# Patient Record
Sex: MALE | Race: WHITE
[De-identification: names, ages, dates, MRNs, and addresses within clinical notes are randomized per-mention and may not be internally consistent; named-entity substitution may affect disease eponyms.]

---

## 2018-10-05 ENCOUNTER — HOSPITAL ENCOUNTER (OUTPATIENT)
Dept: HOSPITAL 97 - ER | Age: 28
Setting detail: OBSERVATION
LOS: 2 days | Discharge: HOME | End: 2018-10-07
Attending: INTERNAL MEDICINE | Admitting: INTERNAL MEDICINE
Payer: SELF-PAY

## 2018-10-05 VITALS — OXYGEN SATURATION: 100 %

## 2018-10-05 VITALS — BODY MASS INDEX: 15.3 KG/M2

## 2018-10-05 DIAGNOSIS — K21.9: ICD-10-CM

## 2018-10-05 DIAGNOSIS — F12.90: ICD-10-CM

## 2018-10-05 DIAGNOSIS — F17.210: ICD-10-CM

## 2018-10-05 DIAGNOSIS — Z23: ICD-10-CM

## 2018-10-05 DIAGNOSIS — R10.13: ICD-10-CM

## 2018-10-05 DIAGNOSIS — R11.2: Primary | ICD-10-CM

## 2018-10-05 LAB
ALBUMIN SERPL BCP-MCNC: 4.7 G/DL (ref 3.4–5)
ALP SERPL-CCNC: 54 U/L (ref 45–117)
ALT SERPL W P-5'-P-CCNC: 24 U/L (ref 12–78)
AST SERPL W P-5'-P-CCNC: 14 U/L (ref 15–37)
BUN BLD-MCNC: 16 MG/DL (ref 7–18)
GLUCOSE SERPLBLD-MCNC: 195 MG/DL (ref 74–106)
HCT VFR BLD CALC: 40.9 % (ref 39.6–49)
INR BLD: 1.18
LIPASE SERPL-CCNC: 72 U/L (ref 73–393)
LYMPHOCYTES # SPEC AUTO: 1.1 K/UL (ref 0.7–4.9)
MAGNESIUM SERPL-MCNC: 1.9 MG/DL (ref 1.8–2.4)
MCH RBC QN AUTO: 34.5 PG (ref 27–35)
MCV RBC: 96.9 FL (ref 80–100)
METHAMPHET UR QL SCN: NEGATIVE
MORPHOLOGY BLD-IMP: (no result)
NT-PROBNP SERPL-MCNC: 21 PG/ML (ref ?–125)
PMV BLD: 7.8 FL (ref 7.6–11.3)
POTASSIUM SERPL-SCNC: 3.4 MMOL/L (ref 3.5–5.1)
RBC # BLD: 4.22 M/UL (ref 4.33–5.43)
THC SERPL-MCNC: POSITIVE NG/ML
TROPONIN (EMERG DEPT USE ONLY): < 0.02 NG/ML (ref 0–0.04)

## 2018-10-05 PROCEDURE — 81003 URINALYSIS AUTO W/O SCOPE: CPT

## 2018-10-05 PROCEDURE — 99285 EMERGENCY DEPT VISIT HI MDM: CPT

## 2018-10-05 PROCEDURE — 87040 BLOOD CULTURE FOR BACTERIA: CPT

## 2018-10-05 PROCEDURE — 96375 TX/PRO/DX INJ NEW DRUG ADDON: CPT

## 2018-10-05 PROCEDURE — 96374 THER/PROPH/DIAG INJ IV PUSH: CPT

## 2018-10-05 PROCEDURE — 93005 ELECTROCARDIOGRAM TRACING: CPT

## 2018-10-05 PROCEDURE — 84132 ASSAY OF SERUM POTASSIUM: CPT

## 2018-10-05 PROCEDURE — 85610 PROTHROMBIN TIME: CPT

## 2018-10-05 PROCEDURE — 83690 ASSAY OF LIPASE: CPT

## 2018-10-05 PROCEDURE — 84484 ASSAY OF TROPONIN QUANT: CPT

## 2018-10-05 PROCEDURE — 96361 HYDRATE IV INFUSION ADD-ON: CPT

## 2018-10-05 PROCEDURE — 80053 COMPREHEN METABOLIC PANEL: CPT

## 2018-10-05 PROCEDURE — 76705 ECHO EXAM OF ABDOMEN: CPT

## 2018-10-05 PROCEDURE — 80076 HEPATIC FUNCTION PANEL: CPT

## 2018-10-05 PROCEDURE — 83735 ASSAY OF MAGNESIUM: CPT

## 2018-10-05 PROCEDURE — 85025 COMPLETE CBC W/AUTO DIFF WBC: CPT

## 2018-10-05 PROCEDURE — 83880 ASSAY OF NATRIURETIC PEPTIDE: CPT

## 2018-10-05 PROCEDURE — 80048 BASIC METABOLIC PNL TOTAL CA: CPT

## 2018-10-05 PROCEDURE — 71045 X-RAY EXAM CHEST 1 VIEW: CPT

## 2018-10-05 PROCEDURE — 80307 DRUG TEST PRSMV CHEM ANLYZR: CPT

## 2018-10-05 PROCEDURE — 36415 COLL VENOUS BLD VENIPUNCTURE: CPT

## 2018-10-05 PROCEDURE — 74177 CT ABD & PELVIS W/CONTRAST: CPT

## 2018-10-05 RX ADMIN — ONDANSETRON PRN MG: 2 INJECTION INTRAMUSCULAR; INTRAVENOUS at 11:57

## 2018-10-05 RX ADMIN — HYDROCODONE BITARTRATE AND ACETAMINOPHEN PRN TAB: 7.5; 325 TABLET ORAL at 15:49

## 2018-10-05 RX ADMIN — MORPHINE SULFATE PRN MG: 2 INJECTION, SOLUTION INTRAMUSCULAR; INTRAVENOUS at 11:56

## 2018-10-05 RX ADMIN — ONDANSETRON PRN MG: 2 INJECTION INTRAMUSCULAR; INTRAVENOUS at 17:40

## 2018-10-05 RX ADMIN — SODIUM CHLORIDE SCH MLS: 0.9 INJECTION, SOLUTION INTRAVENOUS at 15:50

## 2018-10-05 RX ADMIN — SODIUM CHLORIDE SCH: 0.9 INJECTION, SOLUTION INTRAVENOUS at 07:00

## 2018-10-05 RX ADMIN — MORPHINE SULFATE PRN MG: 2 INJECTION, SOLUTION INTRAMUSCULAR; INTRAVENOUS at 21:54

## 2018-10-05 RX ADMIN — Medication SCH ML: at 08:21

## 2018-10-05 RX ADMIN — SODIUM CHLORIDE SCH MG: 0.9 INJECTION, SOLUTION INTRAVENOUS at 08:21

## 2018-10-05 RX ADMIN — METRONIDAZOLE SCH MLS: 500 INJECTION, SOLUTION INTRAVENOUS at 17:40

## 2018-10-05 RX ADMIN — CIPROFLOXACIN SCH MLS: 2 INJECTION, SOLUTION INTRAVENOUS at 21:53

## 2018-10-05 RX ADMIN — Medication SCH: at 21:00

## 2018-10-05 RX ADMIN — MORPHINE SULFATE PRN MG: 2 INJECTION, SOLUTION INTRAMUSCULAR; INTRAVENOUS at 17:40

## 2018-10-05 NOTE — RAD REPORT
EXAM DESCRIPTION:  CT - Abdomen   Pelvis W Contrast - 10/5/2018 6:43 am

 

CLINICAL HISTORY:   Abdominal pain. And vomiting for 3 hours.

 

COMPARISON:  2017

 

TECHNIQUE:  Computed axial tomography of the abdomen and pelvis was obtained. 100 cc Isovue-300 is ad
ministered intravenously. Oral contrast was given.Preliminary report generated by [employer] 
and reviewed prior to dictation

 

All CT scans are performed using dose optimization technique as appropriate and may include automated
 exposure control or mA/KV adjustment according to patient size.

 

FINDINGS:

The liver, spleen, pancreas, adrenals and kidneys appear unremarkable.

 

 There is no evidence of diverticulitis

 

The appendix is not seen

 

IMPRESSION:   No acute abnormality displayed

## 2018-10-05 NOTE — P.HP
Certification for Inpatient


Patient admitted to: Observation


With expected LOS: <2 Midnights


Practitioner: I am a practitioner with admitting privileges, knowledge of 

patient current condition, hospital course, and medical plan of care.


Services: Services provided to patient in accordance with Admission 

requirements found in Title 42 Section 412.3 of the Code of Federal Regulations





Patient History


Date of Service: 10/05/18


Reason for admission: Abdominal pain


History of Present Illness: 





Mr Quijano is a 28-year-old male who came to ER complaining of severe abdominal 

pain. The pain is located in epigastric area, is 10/10 of intensity, associated 

subjective fever, nausea and vomiting.  He also states that has had diarrhea a 

couple of times at home.  He symptoms started last night after he had dinner.  

He denied any blood in stools or his vomiting.  The pain is constant unknown 

not radiate anywhere.  Lab work remarkable for leukocytosis 13.5 K and bands 2%

.  CT abdomen and pelvis shows no acute abnormalities.  He has history of 

appendectomy in the past.  UA shows no sign of acute infection.


Allergies





naproxen Allergy (Unverified 05/06/16 23:48)


 Unknown





Home medications list reviewed: Yes


Home Medications: 








Ciprofloxacin HCl [Cipro 500 MG Tablet] 500 mg PO BID #14 tablet 10/19/15 


Metronidazole 500 mg PO TID #21 tablet 10/19/15 








- Past Medical/Surgical History


Diabetic: No


-: wrist fx


-: TIA with aphasia (7/15)


-: R wrist fx


-: appendectomy





- Family History


  ** Mother


-: Cancer


Notes: lung cx





- Social History


Smoking Status: Current every day smoker


Counseled patient to stop smoking for: less than 10 minutes


Alcohol use: Yes


CD- Drugs: No


Caffeine use: Yes


Place of Residence: Home





Review of Systems


10-point ROS is otherwise unremarkable





Physical Examination





- Physical Exam


General: Alert, Moderate distress (Due to abdominal pain)


HEENT: Atraumatic, PERRLA, Mucous membr. moist/pink, EOMI, Sclerae nonicteric


Neck: Supple, 2+ carotid pulse no bruit, No LAD, Without JVD or thyroid 

abnormality


Respiratory: Clear to auscultation bilaterally, Normal air movement


Cardiovascular: Regular rate/rhythm, Normal S1 S2


Gastrointestinal: Hypoactive, Tenderness (Epigastric area)


Musculoskeletal: No tenderness


Integumentary: No rashes


Neurological: Normal speech, Normal strength at 5/5 x4 extr, Normal tone, 

Normal affect


Lymphatics: No axilla or inguinal lymphadenopathy





- Studies


Laboratory Data (last 24 hrs)





10/05/18 03:10: PT 13.9 H, INR 1.18


10/05/18 03:10: WBC 13.5 H, Hgb 14.6, Hct 40.9, Plt Count 375


10/05/18 03:10: Sodium 141, Potassium 3.4 L, BUN 16, Creatinine 1.00, Glucose 

195 H, Magnesium 1.9, Total Bilirubin 0.6, AST 14 L, ALT 24, Alkaline 

Phosphatase 54, Lipase 72 L








Assessment and Plan





- Problems (Diagnosis)


(1) Abdominal pain


Onset Date: 10/19/15   Current Visit: No   Status: Acute   


Qualifiers: 


   Abdominal location: epigastric   Qualified Code(s): R10.13 - Epigastric pain

   





(2) Nausea & vomiting


Onset Date: 10/19/15   Current Visit: No   Status: Acute   


Qualifiers: 


   Vomiting type: unspecified   Vomiting Intractability: intractable   

Qualified Code(s): R11.2 - Nausea with vomiting, unspecified   





- Plan





The patient will be admitted to the hospital due to severe epigastric pain 

associated with nausea vomiting.  He has leukocytosis with bandemia.  However 

CT scan of abdomen and pelvis shows no acute abnormality.  Will admit the 

patient for pain control, will order an abdominal ultrasound, consult surgery 

team for evaluation and recommendation.  





- Advance Directives


Does patient have a Living Will: No


Does patient have a Durable POA for Healthcare: No





- Code Status/Comfort Care


Code Status Assessed: Yes


Code Status: Full Code

## 2018-10-05 NOTE — P.PN
Subjective


Date of Service: 10/05/18


Primary Care Provider: None


Chief Complaint: Abdominal pain


Subjective: Other (Patient is slightly improved.  Still with abdominal pain.)





Physical Examination





- Vital Signs


Temperature: 99.2 F


Blood Pressure: 117/73


Pulse: 77


Respirations: 18


Pulse Ox (%): 99





- Physical Exam


General: Alert, In no apparent distress, Oriented x3, Cooperative


HEENT: Atraumatic


Neck: Supple


Respiratory: Clear to auscultation bilaterally, Normal air movement


Cardiovascular: Normal pulses, Regular rate/rhythm


Gastrointestinal: Normal bowel sounds, Soft and benign, Non-distended, No masses

, No rebound, No guarding, Tenderness (Mild tenderness to the epigastric region)


Musculoskeletal: No erythema, No tenderness, No warmth


Integumentary: No tenderness/swelling, No erythema, No warmth, No cyanosis


Neurological: Normal speech, Normal strength at 5/5 x4 extr, Normal tone, 

Normal affect





- Studies


Laboratory Data (last 24 hrs)





10/05/18 03:10: PT 13.9 H, INR 1.18


10/05/18 03:10: WBC 13.5 H, Hgb 14.6, Hct 40.9, Plt Count 375


10/05/18 03:10: Sodium 141, Potassium 3.4 L, BUN 16, Creatinine 1.00, Glucose 

195 H, Magnesium 1.9, Total Bilirubin 0.6, AST 14 L, ALT 24, Alkaline 

Phosphatase 54, Lipase 72 L





Medications List Reviewed: Yes





Assessment & Plan


Discharge Plan: Home


Plan to discharge in: 24 Hours


Physician Review Additional Text: 





Impression:


Nausea, vomiting and epigastric pain likely enteritis


Positive drug screen for THC





Plan:


Nausea, vomiting and epigastric pain likely enteritis


Positive drug screen for THC





Will continue monitor closely.  Will provide medication for nausea and pain. 

Will advance diet to clears.  If more stable will advance diet to soft.  

Surgery consulted.  Case discussed with surgery.  No surgical intervention 

required.  Will continue with Cipro and Flagyl.  Will provide Protonix.  Will 

reassess tomorrow.  THC cessation education addressed in detail.


Time Spent Managing Pts Care (In Minutes): 55

## 2018-10-05 NOTE — ER
Nurse's Notes                                                                                     

 [facility]                                                                

Name: Zhang Quijano                                                                                

Age: 28 yrs                                                                                       

Sex: Male                                                                                         

: 1990                                                                                   

MRN: [id number]                                                                                   

Arrival Date: 10/05/2018                                                                          

Time: 02:16                                                                                       

Account#: [id number]                                                                            

Bed 15                                                                                            

Private MD:                                                                                       

Diagnosis: Chest pain, unspecified;Abdominal tenderness-intractable;Vomiting                      

                                                                                                  

Presentation:                                                                                     

10/05                                                                                             

02:18 Presenting complaint: Patient states: chest pain and vomiting since 3 hours back after  cc3 

      eating pork chop. Transition of care: patient was not received from another setting of      

      care. Onset of symptoms was 2018. Risk Assessment: Do you want to hurt          

      yourself or someone else? Patient reports no desire to harm self or others. Initial         

      Sepsis Screen: Does the patient meet any 2 criteria? No. Patient's initial sepsis           

      screen is negative. Does the patient have a suspected source of infection? No.              

      Patient's initial sepsis screen is negative. Care prior to arrival: None.                   

02:18 Method Of Arrival: Ambulatory                                                           cc3 

02:18 Acuity: SUZETTE 3                                                                           cc3 

                                                                                                  

Triage Assessment:                                                                                

02:18 General: Appears distressed, uncomfortable, Behavior is calm, cooperative, appropriate  cc3 

      for age. Pain: Complains of pain in left upper quadrant and right upper quadrant and        

      epigastric area Pain currently is 10 out of 10 on a pain scale. Quality of pain is          

      described as aching, Pain began 3 hours ago prior to consult. EENT: No signs and/or         

      symptoms were reported regarding the EENT system. Neuro: Level of Consciousness is          

      awake, alert, obeys commands, Oriented to person, place, time, situation, Appropriate       

      for age. Cardiovascular: Reports chest pain, since 3 hours ago prior to consult.            

      Respiratory: Airway is patent Respiratory effort is even, unlabored, Respiratory            

      pattern is regular, symmetrical. GI: Reports upper abdominal pain, cramping, epigastric     

      pain, vomiting, since 3 hours back prior to consult. : No signs and/or symptoms were      

      reported regarding the genitourinary system. Derm: No signs and/or symptoms reported        

      regarding the dermatologic system. Musculoskeletal: Circulation, motion, and sensation      

      intact. Range of motion: intact in all extremities.                                         

                                                                                                  

Historical:                                                                                       

- Allergies:                                                                                      

02:53 Naproxen;                                                                               cc3 

- Home Meds:                                                                                      

02:53 None [Active];                                                                          cc3 

- PMHx:                                                                                           

02:53 Anxiety; Back pain; Kidney stones;                                                      cc3 

- PSHx:                                                                                           

02:53 Appendectomy;                                                                           cc3 

                                                                                                  

- Immunization history:: Adult Immunizations not up to date.                                      

- Social history:: Smoking status: Patient uses tobacco products, cigars.                         

- Family history:: not pertinent.                                                                 

- Ebola Screening: : No symptoms or risks identified at this time.                                

                                                                                                  

                                                                                                  

Screenin:18 Abuse screen: Denies threats or abuse. Denies injuries from another. Nutritional        cc3 

      screening: No deficits noted. Tuberculosis screening: No symptoms or risk factors           

      identified. Fall Risk Ambulatory Aid- None/Bed Rest/Nurse Assist (0 pts). Gait-             

      Normal/Bed Rest/Wheelchair (0 pts) Mental Status- Oriented to own ability (0 pts).          

                                                                                                  

Assessment:                                                                                       

02:20 General: see triage note.                                                               cc3 

02:20 Pain: Pain does not radiate.                                                            cc3 

03:30 Reassessment: Patient appears in no apparent distress at this time. Patient and/or      cc3 

      family updated on plan of care and expected duration. Pain level reassessed. Patient is     

      alert, oriented x 3, equal unlabored respirations, skin warm/dry/pink. Patient still        

      cannot take the oral contrast and feels vomiting whenever he tries to, informed Dr. Butler and he said no need to give the oral contrast and just do the plain CT scan of     

      the abdomen. Informed Dr. Butler regarding the patient's ECG SV rhythm and he said he     

      saw the 12-L ECG that was done for the patient and he said the patient's fine. Informed     

      charge nurse Beena regarding Dr. Butler's plan for CT scan abdomen without giving        

      the oral contrast and charge nurse Beena informed the CT scan department.                  

04:00 Reassessment: Patient appears in no apparent distress at this time. Patient and/or      cc3 

      family updated on plan of care and expected duration. Pain level reassessed. Patient is     

      alert, oriented x 3, equal unlabored respirations, skin warm/dry/pink. Patient taken by     

      CT scan technician for CT scan abdomen/pelvis procedure.                                    

04:20 Reassessment: Patient appears in no apparent distress at this time. Patient and/or      cc3 

      family updated on plan of care and expected duration. Pain level reassessed. Patient is     

      alert, oriented x 3, equal unlabored respirations, skin warm/dry/pink. Patient came         

      back from CT scan department, CT scan abdomen/pelvis done as ordered, awaiting for          

      final report.                                                                               

05:39 Reassessment: Patient appears in no apparent distress at this time. Patient and/or      cc3 

      family updated on plan of care and expected duration. Pain level reassessed. Patient is     

      alert, oriented x 3, equal unlabored respirations, skin warm/dry/pink.                      

06:00 Reassessment: Dr. Cotter came and assessed the patient.                               cc3 

06:05 Reassessment: Patient for admission as a case of chest pain unspecified, admission      cc3 

      process ongoing.                                                                            

06:30 Reassessment: Bed assigned in room 203, called extension [id number] and report handed over to cc3 

      RN Toña Beckford for continuity of care and management.                               

06:35 Reassessment: Patient appears in no apparent distress at this time. Patient and/or      cc3 

      family updated on plan of care and expected duration. Pain level reassessed. Patient is     

      alert, oriented x 3, equal unlabored respirations, skin warm/dry/pink. Patient left ER      

      for admission vitally stable by wheelchair escorted by ED jose Dempsey.                     

                                                                                                  

Vital Signs:                                                                                      

02:18  / 122; Pulse 96; Resp 21 S; Temp 98.8(O); Pulse Ox 98% on R/A; Weight 63.5 kg;   cc3 

      Height 5 ft. 11 in. (180.34 cm); Pain 10/10;                                                

03:48  / 83 LA Supine (auto/reg); Pulse 66 MON; Resp 20 S; Pulse Ox 97% on R/A; Pain    ds4 

      8/10;                                                                                       

04:30  / 87; Pulse 86; Resp 18 S; Pulse Ox 100% on R/A;                                 cc3 

05:40  / 96; Pulse 84; Resp 19 S; Pulse Ox 100% on R/A;                                 cc3 

06:05  / 98; Pulse 66; Resp 19 S; Pulse Ox 99% on R/A;                                  cc3 

02:18 Body Mass Index 19.52 (63.50 kg, 180.34 cm)                                             3 

                                                                                                  

ED Course:                                                                                        

02:16 Patient arrived in ED.                                                                  do  

02:18 Arm band placed on right wrist.                                                         cc3 

02:18 Patient has correct armband on for positive identification. Bed in low position. Call   cc3 

      light in reach. Side rails up X 1. Cardiac monitor on. Pulse ox on. NIBP on.                

02:20 Patient maintains SpO2 saturation greater than 95% on room air.                         cc3 

02:22 Brit Ta is Primary Nurse.                                                      cc3 

02:24 Triage completed.                                                                       cc3 

02:25 Lenin Butler MD is Attending Physician.                                             rylan 

02:25 Inserted saline lock: 20 gauge in right antecubital area, using aseptic technique.      cc3 

      Blood collected.                                                                            

02:50 Lipase Sent.                                                                            ds4 

02:50 Basic Metabolic Panel Sent.                                                             ds4 

02:50 CBC with Diff Sent.                                                                     ds4 

02:50 LFT's Sent.                                                                             ds4 

02:50 Magnesium Sent.                                                                         ds4 

02:50 NT PRO-BNP Sent.                                                                        ds4 

02:51 PT-INR Sent.                                                                            ds4 

02:51 Troponin (emerg Dept Use Only) Sent.                                                    ds4 

02:57 XRAY Chest (1 view) In Process Unspecified.                                             EDMS

03:19 Lipase Sent.                                                                            ds4 

03:19 Basic Metabolic Panel Sent.                                                             ds4 

03:19 CBC with Diff Sent.                                                                     ds4 

03:19 LFT's Sent.                                                                             ds4 

03:19 Magnesium Sent.                                                                         ds4 

03:20 NT PRO-BNP Sent.                                                                        ds4 

03:20 PT-INR Sent.                                                                            ds4 

03:20 Troponin (emerg Dept Use Only) Sent.                                                    ds4 

03:34 Radiology exam delayed due to lab results not completed at this time. (BUN/Creatinine). jg6 

03:34 Radiology exam delayed due to Patient has been unable to drink oral contrast due to     jg6 

      severe nauseousness. Has been given medication for nausea but still unable to drink         

      oral contrast. Dr. Butler has advised to complete exam using only IV contrast at this     

      time.                                                                                       

03:45 Lipase Sent.                                                                            ds4 

03:45 Troponin (emerg Dept Use Only) Sent.                                                    ds4 

03:46 NT PRO-BNP Sent.                                                                        ds4 

03:46 Magnesium Sent.                                                                         ds4 

03:46 LFT's Sent.                                                                             ds4 

03:46 Basic Metabolic Panel Sent.                                                             ds4 

03:46 CBC with Diff Sent.                                                                     ds4 

03:49 Radiology exam delayed due to lab results not completed at this time. (BUN/Creatinine). jg6 

04:03 Patient moved to CT via wheelchair.                                                     kw1 

04:11 CT completed. Patient tolerated procedure well. Patient moved back from CT.             kw1 

04:12 CT Abd/Pelvis - W/Contrast In Process Unspecified.                                      EDMS

04:42 UDS Sent.                                                                               ds4 

04:47 Urine Dipstick--Ancillary (enter results) Sent.                                         ds4 

06:01 Jeannine Raya MD is Hospitalizing Provider.                                          rylan 

06:35 No provider procedures requiring assistance completed. Patient admitted, IV remains in  cc3 

      place.                                                                                      

                                                                                                  

Administered Medications:                                                                         

02:35 Drug: NS 0.9% 1000 ml Route: IV; Rate: 1 bolus; Site: right antecubital;                cc3 

04:00 Follow up: Response: No adverse reaction; IV Status: Completed infusion; IV Intake:     cc3 

      1000ml                                                                                      

02:38 Drug: Zofran 4 mg Route: IVP; Site: right antecubital;                                  cc3 

03:50 Follow up: Response: No adverse reaction; Nausea is decreased; Vomiting decreased       cc3 

02:41 Drug: morphine 4 mg Route: IVP; Site: right antecubital;                                cc3 

03:51 Follow up: Response: No adverse reaction; Pain is decreased                             cc3 

02:44 Drug: ProTONIX 40 mg Route: IVP; Site: right antecubital;                               cc3 

03:51 Follow up: Response: No adverse reaction; Pain is decreased                             cc3 

04:30 Drug: NS 0.9% with KCl 20 mEq/L 1000 ml Route: IV; Rate: 125 ml/hr; Site: right         cc3 

      antecubital;                                                                                

06:30 Follow up: Response: No adverse reaction; IV Status: Infusion continued upon admission  cc3 

05:23 Drug: Zofran 4 mg Route: IVP; Site: right antecubital;                                  cc3 

05:55 Follow up: Response: No adverse reaction; Nausea is decreased; Vomiting decreased       cc3 

05:27 Drug: morphine 4 mg Route: IVP; Site: right antecubital;                                cc3 

05:55 Follow up: Response: No adverse reaction; Pain is decreased                             cc3 

                                                                                                  

                                                                                                  

Intake:                                                                                           

04:00 IV: 1000ml; Total: 1000ml.                                                              cc3 

                                                                                                  

Outcome:                                                                                          

06:02 Decision to Hospitalize by Provider.                                                    rylan 

06:35 Admitted to Med/surg accompanied by tech, via wheelchair, room 203, with chart, Report  cc3 

      called to  LEANDER Beckford                                                           

06:35 Condition: stable                                                                           

06:35 Instructed on the need for admit.                                                           

06:44 Patient left the ED.                                                                    cc3 

                                                                                                  

Signatures:                                                                                       

Dispatcher [employer]                           Lenin Reynoso MD MD cha Swanson, Donovan                             ds4                                                  

Ivory Chau Kimberly                            kw1                                                  

Brit Ta                             cc3                                                  

Tiffanie [place]                              jg6                                                  

                                                                                                  

Corrections: (The following items were deleted from the chart)                                    

02:26 02:18  / 122; Pulse 96bpm; Resp 21bpm; Spontaneous; Pulse Ox 98% RA; 63.5 kg;     cc3 

      Height 5 ft. 11 in.; BMI: 19.5; cc3                                                         

02:55 02:18 Social history: Smoking status: unknown [facility] 

04:06 03:25 Reassessment: Patient appears in no apparent distress at this time. Patient       cc3 

      and/or family updated on plan of care and expected duration. Pain level reassessed.         

      Patient is alert, oriented x 3, equal unlabored respirations, skin warm/dry/pink.           

      Patient still cannot take the oral contrast and feels vomiting whenever he tries to,        

      informed Dr. Butler and he said no need to give the oral contrast and just do the         

      plain CT scan of the abdomen. Informed Dr. Butler regarding the patient's ECG SV          

      rhythm and he said he saw the 12-L ECG that was done for the patient and he said the        

      patient's fine. Informed charge nurse Beena regarding Dr. Butler's plan for CT scan      

      abdomen without giving the oral contrast and charge nurse Beena informed the CT scan       

      department. 3                                                                             

04: 02:53 PSHx: None; [facility] 

05:46 02:53 Allergies: NKDA; 3                                                              3 

05:46 02:53 Allergies: Naproxen [Inactive]; 3                                               3 

                                                                                                  

**************************************************************************************************

## 2018-10-05 NOTE — EDPHYS
Physician Documentation                                                                           

 [facility]                                                                

Name: Zhang Quijano                                                                                

Age: 28 yrs                                                                                       

Sex: Male                                                                                         

: 1990                                                                                   

MRN: [id number]                                                                                   

Arrival Date: 10/05/2018                                                                          

Time: 02:16                                                                                       

Account#: [id number]                                                                            

Bed 15                                                                                            

Private MD:                                                                                       

ED Physician Lenin Butler                                                                      

HPI:                                                                                              

10/05                                                                                             

02:31 This 28 yrs old  Male presents to ER via Ambulatory with complaints of Chest   rylan 

      Pain, Vomiting.                                                                             

02:31 The patient or guardian reports chest pain that is located primarily in the anterior    rylan 

      chest wall, bilaterally.                                                                    

                                                                                                  

Historical:                                                                                       

- Allergies:                                                                                      

02:53 Naproxen;                                                                               cc3 

- Home Meds:                                                                                      

02:53 None [Active];                                                                          cc3 

- PMHx:                                                                                           

02:53 Anxiety; Back pain; Kidney stones;                                                      cc3 

- PSHx:                                                                                           

02:53 Appendectomy;                                                                           cc3 

                                                                                                  

- Immunization history:: Adult Immunizations not up to date.                                      

- Social history:: Smoking status: Patient uses tobacco products, cigars.                         

- Family history:: not pertinent.                                                                 

- Ebola Screening: : No symptoms or risks identified at this time.                                

                                                                                                  

                                                                                                  

ROS:                                                                                              

02:31 Constitutional: Negative for fever, chills, and weight loss, Eyes: Negative for injury, rylan 

      pain, redness, and discharge, ENT: Negative for injury, pain, and discharge, Neck:          

      Negative for injury, pain, and swelling, Respiratory: Negative for shortness of breath,     

      cough, wheezing, and pleuritic chest pain, Back: Negative for injury and pain, :          

      Negative for injury, bleeding, discharge, and swelling, MS/Extremity: Negative for          

      injury and deformity, Skin: Negative for injury, rash, and discoloration, Neuro:            

      Negative for headache, weakness, numbness, tingling, and seizure, Psych: Negative for       

      depression, anxiety, suicide ideation, homicidal ideation, and hallucinations,              

      Allergy/Immunology: Negative for hives, rash, and allergies, Endocrine: Negative for        

      neck swelling, polydipsia, polyuria, polyphagia, and marked weight changes,                 

      Hematologic/Lymphatic: Negative for swollen nodes, abnormal bleeding, and unusual           

      bruising.                                                                                   

02:31 Cardiovascular: Positive for                                                                

02:31 Respiratory: Positive for cough.                                                            

02:31 Abdomen/GI: Positive for abdominal pain, nausea and vomiting, of the epigastric area,       

      right upper quadrant and left upper quadrant.                                               

                                                                                                  

Exam:                                                                                             

02:31 Constitutional:  This is a well developed, well nourished patient who is awake, alert,  rylan 

      and in no acute distress. Head/Face:  Normocephalic, atraumatic. Eyes:  Pupils equal        

      round and reactive to light, extra-ocular motions intact.  Lids and lashes normal.          

      Conjunctiva and sclera are non-icteric and not injected.  Cornea within normal limits.      

      Periorbital areas with no swelling, redness, or edema. ENT:  Nares patent. No nasal         

      discharge, no septal abnormalities noted.  Tympanic membranes are normal and external       

      auditory canals are clear.  Oropharynx with no redness, swelling, or masses, exudates,      

      or evidence of obstruction, uvula midline.  Mucous membranes moist. Neck:  Trachea          

      midline, no thyromegaly or masses palpated, and no cervical lymphadenopathy.  Supple,       

      full range of motion without nuchal rigidity, or vertebral point tenderness.  No            

      Meningismus. Chest/axilla:  Normal chest wall appearance and motion.  Nontender with no     

      deformity.  No lesions are appreciated. Cardiovascular:  Regular rate and rhythm with a     

      normal S1 and S2.  No gallops, murmurs, or rubs.  Normal PMI, no JVD.  No pulse             

      deficits. Respiratory:  Lungs have equal breath sounds bilaterally, clear to                

      auscultation and percussion.  No rales, rhonchi or wheezes noted.  No increased work of     

      breathing, no retractions or nasal flaring. Back:  No spinal tenderness.  No                

      costovertebral tenderness.  Full range of motion. Male :  Normal genitalia with no        

      discharge or lesions. Skin:  Warm, dry with normal turgor.  Normal color with no            

      rashes, no lesions, and no evidence of cellulitis. MS/ Extremity:  Pulses equal, no         

      cyanosis.  Neurovascular intact.  Full, normal range of motion. Neuro:  Awake and           

      alert, GCS 15, oriented to person, place, time, and situation.  Cranial nerves II-XII       

      grossly intact.  Motor strength 5/5 in all extremities.  Sensory grossly intact.            

      Cerebellar exam normal.  Normal gait. Psych:  Awake, alert, with orientation to person,     

      place and time.  Behavior, mood, and affect are within normal limits.                       

02:31 Abdomen/GI: Inspection: abdomen appears normal, Bowel sounds: normal, Palpation: mild       

      abdominal tenderness, moderate abdominal tenderness, in the epigastric area, right          

      upper quadrant and left upper quadrant.                                                     

                                                                                                  

Vital Signs:                                                                                      

02:18  / 122; Pulse 96; Resp 21 S; Temp 98.8(O); Pulse Ox 98% on R/A; Weight 63.5 kg;   cc3 

      Height 5 ft. 11 in. (180.34 cm); Pain 10/10;                                                

03:48  / 83 LA Supine (auto/reg); Pulse 66 MON; Resp 20 S; Pulse Ox 97% on R/A; Pain    ds4 

      8/10;                                                                                       

04:30  / 87; Pulse 86; Resp 18 S; Pulse Ox 100% on R/A;                                 cc3 

05:40  / 96; Pulse 84; Resp 19 S; Pulse Ox 100% on R/A;                                 cc3 

06:05  / 98; Pulse 66; Resp 19 S; Pulse Ox 99% on R/A;                                  cc3 

02:18 Body Mass Index 19.52 (63.50 kg, 180.34 cm)                                             cc3 

                                                                                                  

MDM:                                                                                              

02:25 Patient medically screened.                                                             [facility] 

02:33 Data reviewed: vital signs, nurses notes, lab test result(s), EKG, radiologic studies,  [facility] 

      CT scan, plain films.                                                                       

                                                                                                  

10/05                                                                                             

02:30 Order name: Basic Metabolic Panel; Complete Time: 04:21                                 rylan 

10/05                                                                                             

02:30 Order name: CBC with Diff; Complete Time: 05:57                                         rylan 

10/05                                                                                             

02:30 Order name: LFT's; Complete Time: 04:21                                                 rylan 

10/05                                                                                             

02:30 Order name: Magnesium; Complete Time: 04:21                                             rylan 

10/05                                                                                             

02:30 Order name: NT PRO-BNP; Complete Time: 04:21                                            rylan 

10/05                                                                                             

02:30 Order name: PT-INR; Complete Time: 04:21                                                rylan 

10/05                                                                                             

02:30 Order name: Troponin (emerg Dept Use Only); Complete Time: 04:21                        rylan 

10/05                                                                                             

02:30 Order name: Lipase; Complete Time: 04:21                                                rylan 

10/05                                                                                             

02:33 Order name: UDS; Complete Time: 05:57                                                   rylan 

10/05                                                                                             

03:32 Order name: Manual Differential; Complete Time: 05:57                                   EDMS

10/05                                                                                             

04:42 Order name: Urine Dipstick--Ancillary (enter results)                                   ds4 

10/05                                                                                             

06:08 Order name: CBC with Automated Diff                                                     EDMS

10/05                                                                                             

06:08 Order name: CBC with Automated Diff                                                     EDMS

10/05                                                                                             

06:08 Order name: Comprehensive Metabolic Panel                                               EDMS

10/05                                                                                             

02:30 Order name: XRAY Chest (1 view)                                                         [facility] 

10/05                                                                                             

02:30 Order name: EKG; Complete Time: 02:31                                                   [facility] 

10/05                                                                                             

02:30 Order name: Cardiac monitoring; Complete Time: 02:48                                    [facility] 

10/05                                                                                             

02:31 Order name: CT Abd/Pelvis - W/Contrast                                                  [facility] 

10/05                                                                                             

05:57 Order name: US Abdomen Limited                                                          [facility] 

10/05                                                                                             

06:05 Order name: CONS Physician Consult                                                      [facility]

10/05                                                                                             

06:08 Order name: NPO                                                                         [facility]

10/05                                                                                             

06:08 Order name: Comprehensive Metabolic Panel                                               [facility]

10/05                                                                                             

06:08 Order name: Blood Culture                                                               [facility]

10/05                                                                                             

02:30 Order name: EKG - Nurse/Tech; Complete Time: 02:34                                      [facility] 

10/05                                                                                             

02:30 Order name: IV Saline Lock; Complete Time: 02:49                                        [facility] 

10/05                                                                                             

02:30 Order name: Labs collected and sent; Complete Time: 02:49                               [facility] 

10/05                                                                                             

02:30 Order name: O2 Per Protocol; Complete Time: 02:49                                       [facility] 

10/05                                                                                             

02:30 Order name: O2 Sat Monitoring; Complete Time: 02:50                                     [facility] 

                                                                                                  

Administered Medications:                                                                         

02:35 Drug: NS 0.9% 1000 ml Route: IV; Rate: 1 bolus; Site: right antecubital;                cc3 

04:00 Follow up: Response: No adverse reaction; IV Status: Completed infusion; IV Intake:     cc3 

      1000ml                                                                                      

02:38 Drug: Zofran 4 mg Route: IVP; Site: right antecubital;                                  cc3 

03:50 Follow up: Response: No adverse reaction; Nausea is decreased; Vomiting decreased       cc3 

02:41 Drug: morphine 4 mg Route: IVP; Site: right antecubital;                                cc3 

03:51 Follow up: Response: No adverse reaction; Pain is decreased                             cc3 

02:44 Drug: ProTONIX 40 mg Route: IVP; Site: right antecubital;                               cc3 

03:51 Follow up: Response: No adverse reaction; Pain is decreased                             cc3 

04:30 Drug: NS 0.9% with KCl 20 mEq/L 1000 ml Route: IV; Rate: 125 ml/hr; Site: right         cc3 

      antecubital;                                                                                

06:30 Follow up: Response: No adverse reaction; IV Status: Infusion continued upon admission  cc3 

05:23 Drug: Zofran 4 mg Route: IVP; Site: right antecubital;                                  cc3 

05:55 Follow up: Response: No adverse reaction; Nausea is decreased; Vomiting decreased       cc3 

05:27 Drug: morphine 4 mg Route: IVP; Site: right antecubital;                                cc3 

05:55 Follow up: Response: No adverse reaction; Pain is decreased                             cc3 

                                                                                                  

                                                                                                  

Disposition:                                                                                      

10/05/18 06:02 Hospitalization ordered by Jeannine Raya for Observation. Preliminary            

  diagnosis are Chest pain, unspecified, Abdominal tenderness - intractable,                      

  Vomiting.                                                                                       

- Bed requested for Telemetry/MedSurg (observation).                                              

- Status is Observation.                                                                      cc3 

- Condition is Stable.                                                                            

- Problem is new.                                                                                 

- Symptoms have improved.                                                                         

UTI on Admission? No                                                                              

                                                                                                  

                                                                                                  

                                                                                                  

Signatures:                                                                                       

Dispatcher [employer]                           EDMS                                                 

Lenin Butler MD MD cha Solis, Maria ms Cordel, Charlene                             3                                                  

                                                                                                  

Corrections: (The following items were deleted from the chart)                                    

02:55 02:18 Social history: Smoking status: unknown 3                                       3 

04:26 02:53 PSHx: None; 3                                                                   3 

05:46 02:53 Allergies: NKDA; 3                                                              cc3 

05:46 02:53 Allergies: Naproxen [Inactive]; 3                                               cc3 

06:13 06:02 Hospitalization Ordered by Jeannine Raya MD for Observation. Preliminary         ms  

      diagnosis is Chest pain, unspecified; Abdominal tenderness - intractable; Vomiting. Bed     

      requested for Telemetry/MedSurg (observation). Status is Observation. Condition is          

      Stable. Problem is new. Symptoms have improved. UTI on Admission? No. [facility]                   

06:44 06:13 10/05/2018 06:02 Hospitalization Ordered by Jeannine Raya MD for Observation.    3 

      Preliminary diagnosis is Chest pain, unspecified; Abdominal tenderness - intractable;       

      Vomiting. Bed requested for Telemetry/MedSurg (observation). Status is Observation.         

      Condition is Stable. Problem is new. Symptoms have improved. UTI on Admission? No. ms       

                                                                                                  

**************************************************************************************************

## 2018-10-05 NOTE — RAD REPORT
EXAM DESCRIPTION:  Karina Single View10/5/2018 2:57 am

 

CLINICAL HISTORY:  Chest pain

 

COMPARISON:  2015

 

FINDINGS:   The lungs appear clear of acute infiltrate. The heart is normal size

 

IMPRESSION:   No acute abnormalities displayed

## 2018-10-05 NOTE — P.CNS
Date of Consult: 10/05/18


PC:  This 28-year-old male presents emergency room with severe abdominal pain 

for diagnosis and treatment.





HPC:  The patient states that the pain began yesterday.  Located the middle 

portion of his abdomen. Very severe.  Cause him to have dry heaves and 

vomiting.  No diarrhea.





PMH:  Negative





PSHx:  Negative





SOC:  Allergic to Naprosyn





SYS REVIEW:  No cough, wheeze, shortness of breath.  No chest pain or 

palpitations.  No urinary complaints





O/E awake alert very uncomfortable looking at the moment, vital signs are stable








HEENT:  Within normal limit





Chest:  Chest movement is equal bilaterally





ABD:  Abdomen is soft, nontender, no guarding.  Patient however describes his 

abdomen as sore during exam.





LOCO:  Intact, no heel tap tenderness





DATA:  White cell count mildly elevate





IMPRESSION:  Abdominal pain, possible enteritis





PLAN:  This patient is not have a surgical abdomen at this time.  I will 

increase his pain medicine as the patient is generally uncomfortable.  Has 

received some antibiotics, will leave dosing to hospitalist, may benefit from 

p.o. antibiotics.  Will follow with you.

## 2018-10-05 NOTE — RAD REPORT
EXAM DESCRIPTION:  US - Abdomen Exam Limited - 10/5/2018 7:08 am

 

CLINICAL HISTORY:  Abdominal pain.

 

COMPARISON:  None.

 

FINDINGS:   The gallbladder wall is not thickened. A gallstone is not seen.

 

The biliary tree is normal caliber.

 

IMPRESSION:  Unremarkable gallbladder ultrasound.

## 2018-10-06 LAB
ALBUMIN SERPL BCP-MCNC: 3.6 G/DL (ref 3.4–5)
ALP SERPL-CCNC: 42 U/L (ref 45–117)
ALT SERPL W P-5'-P-CCNC: 19 U/L (ref 12–78)
AST SERPL W P-5'-P-CCNC: 9 U/L (ref 15–37)
BUN BLD-MCNC: 11 MG/DL (ref 7–18)
GLUCOSE SERPLBLD-MCNC: 123 MG/DL (ref 74–106)
HCT VFR BLD CALC: 35.7 % (ref 39.6–49)
LYMPHOCYTES # SPEC AUTO: 2.1 K/UL (ref 0.7–4.9)
MAGNESIUM SERPL-MCNC: 2.2 MG/DL (ref 1.8–2.4)
MCH RBC QN AUTO: 34.8 PG (ref 27–35)
MCV RBC: 98.3 FL (ref 80–100)
PMV BLD: 8 FL (ref 7.6–11.3)
POTASSIUM SERPL-SCNC: 3.8 MMOL/L (ref 3.5–5.1)
RBC # BLD: 3.63 M/UL (ref 4.33–5.43)

## 2018-10-06 RX ADMIN — METRONIDAZOLE SCH MLS: 500 INJECTION, SOLUTION INTRAVENOUS at 16:28

## 2018-10-06 RX ADMIN — Medication SCH ML: at 21:46

## 2018-10-06 RX ADMIN — MORPHINE SULFATE PRN MG: 2 INJECTION, SOLUTION INTRAMUSCULAR; INTRAVENOUS at 21:44

## 2018-10-06 RX ADMIN — ONDANSETRON PRN MG: 2 INJECTION INTRAMUSCULAR; INTRAVENOUS at 03:17

## 2018-10-06 RX ADMIN — CIPROFLOXACIN SCH MLS: 2 INJECTION, SOLUTION INTRAVENOUS at 08:04

## 2018-10-06 RX ADMIN — MORPHINE SULFATE PRN MG: 2 INJECTION, SOLUTION INTRAMUSCULAR; INTRAVENOUS at 13:43

## 2018-10-06 RX ADMIN — SODIUM CHLORIDE SCH MG: 0.9 INJECTION, SOLUTION INTRAVENOUS at 08:04

## 2018-10-06 RX ADMIN — SODIUM CHLORIDE SCH MLS: 0.45 INJECTION, SOLUTION INTRAVENOUS at 17:56

## 2018-10-06 RX ADMIN — METRONIDAZOLE SCH MLS: 500 INJECTION, SOLUTION INTRAVENOUS at 08:03

## 2018-10-06 RX ADMIN — MORPHINE SULFATE PRN MG: 2 INJECTION, SOLUTION INTRAMUSCULAR; INTRAVENOUS at 08:04

## 2018-10-06 RX ADMIN — ONDANSETRON PRN MG: 2 INJECTION INTRAMUSCULAR; INTRAVENOUS at 21:45

## 2018-10-06 RX ADMIN — SODIUM CHLORIDE SCH MLS: 0.9 INJECTION, SOLUTION INTRAVENOUS at 03:18

## 2018-10-06 RX ADMIN — ONDANSETRON PRN MG: 2 INJECTION INTRAMUSCULAR; INTRAVENOUS at 16:29

## 2018-10-06 RX ADMIN — CIPROFLOXACIN SCH MLS: 2 INJECTION, SOLUTION INTRAVENOUS at 21:46

## 2018-10-06 RX ADMIN — Medication SCH ML: at 08:05

## 2018-10-06 RX ADMIN — SODIUM CHLORIDE SCH: 0.45 INJECTION, SOLUTION INTRAVENOUS at 10:00

## 2018-10-06 RX ADMIN — HYDROCODONE BITARTRATE AND ACETAMINOPHEN PRN TAB: 7.5; 325 TABLET ORAL at 03:18

## 2018-10-06 RX ADMIN — METRONIDAZOLE SCH MLS: 500 INJECTION, SOLUTION INTRAVENOUS at 00:27

## 2018-10-06 RX ADMIN — SODIUM CHLORIDE SCH MLS: 0.45 INJECTION, SOLUTION INTRAVENOUS at 21:46

## 2018-10-06 NOTE — EKG
Test Date:    2018-10-05               Test Time:    02:22:35

Technician:   NICKY                                    

                                                     

MEASUREMENT RESULTS:                                       

Intervals:                                           

Rate:         83                                     

CA:           142                                    

QRSD:         80                                     

QT:           372                                    

QTc:          437                                    

Axis:                                                

P:            74                                     

CA:           142                                    

QRS:          89                                     

T:            76                                     

                                                     

INTERPRETIVE STATEMENTS:                                       

                                                     

Normal sinus rhythm with sinus arrhythmia

Normal ECG

Compared to ECG 07/13/2015 07:23:49

Sinus bradycardia no longer present

Atrial premature complex(es) no longer present



Electronically Signed On 10-06-18 07:49:11 CDT by Haim Kimble

## 2018-10-07 VITALS — TEMPERATURE: 97.7 F | DIASTOLIC BLOOD PRESSURE: 79 MMHG | SYSTOLIC BLOOD PRESSURE: 121 MMHG

## 2018-10-07 LAB
ALBUMIN SERPL BCP-MCNC: 3.8 G/DL (ref 3.4–5)
ALP SERPL-CCNC: 44 U/L (ref 45–117)
ALT SERPL W P-5'-P-CCNC: 18 U/L (ref 12–78)
AST SERPL W P-5'-P-CCNC: 12 U/L (ref 15–37)
BUN BLD-MCNC: 5 MG/DL (ref 7–18)
GLUCOSE SERPLBLD-MCNC: 92 MG/DL (ref 74–106)
HCT VFR BLD CALC: 39.2 % (ref 39.6–49)
LYMPHOCYTES # SPEC AUTO: 2.3 K/UL (ref 0.7–4.9)
MAGNESIUM SERPL-MCNC: 2 MG/DL (ref 1.8–2.4)
MCH RBC QN AUTO: 34.7 PG (ref 27–35)
MCV RBC: 98.4 FL (ref 80–100)
PMV BLD: 8.1 FL (ref 7.6–11.3)
POTASSIUM SERPL-SCNC: 4 MMOL/L (ref 3.5–5.1)
RBC # BLD: 3.98 M/UL (ref 4.33–5.43)

## 2018-10-07 RX ADMIN — ONDANSETRON PRN MG: 2 INJECTION INTRAMUSCULAR; INTRAVENOUS at 04:25

## 2018-10-07 RX ADMIN — SODIUM CHLORIDE SCH: 0.45 INJECTION, SOLUTION INTRAVENOUS at 05:58

## 2018-10-07 RX ADMIN — METRONIDAZOLE SCH MLS: 500 INJECTION, SOLUTION INTRAVENOUS at 00:51

## 2018-10-07 RX ADMIN — Medication SCH: at 09:00

## 2018-10-07 RX ADMIN — CIPROFLOXACIN SCH: 2 INJECTION, SOLUTION INTRAVENOUS at 09:00

## 2018-10-07 RX ADMIN — MORPHINE SULFATE PRN MG: 2 INJECTION, SOLUTION INTRAMUSCULAR; INTRAVENOUS at 04:25

## 2018-10-07 RX ADMIN — METRONIDAZOLE SCH: 500 INJECTION, SOLUTION INTRAVENOUS at 09:00

## 2018-10-07 NOTE — P.DS
Admission Date: 10/05/18


Discharge Date: 10/07/18


Primary Care Provider: Dr. Ward


Disposition: ROUTINE DISCHARGE


Discharge Condition: GOOD


Reason for Admission: Abdominal pain


Consultations: 





Surgery-Dr. Sanders


Procedures: 





CT scan:


COMPARISON:  2017 


TECHNIQUE:  Computed axial tomography of the abdomen and pelvis was obtained. 

100 cc Isovue-300 is administered intravenously. Oral contrast was 

given.Preliminary report generated by Holaira and reviewed prior to 

dictation 


All CT scans are performed using dose optimization technique as appropriate and 

may include automated exposure control or mA/KV adjustment according to patient 

size. 


FINDINGS:


The liver, spleen, pancreas, adrenals and kidneys appear unremarkable. 


 There is no evidence of diverticulitis 


The appendix is not seen 


IMPRESSION:   No acute abnormality displayed





Abdominal ultrasound:


COMPARISON:  None. 


FINDINGS:   The gallbladder wall is not thickened. A gallstone is not seen. 


The biliary tree is normal caliber. 


IMPRESSION:  Unremarkable gallbladder ultrasound.





Medical Problem List: 


Nausea, vomiting and epigastric pain likely enteritis


GERD


Positive drug screen for THC





Brief History of Present Illness: 





28-year-old  male present emergency room with nausea, vomiting and 

abdominal pain. Patient was admitted for further evaluation.


Hospital Course: 





Patient presented with nausea, vomiting and epigastric pain. Patient evaluated 

in the emergency room.  CT scan unremarkable.  Patient was admitted for further 

treatment.  Abdominal ultrasound showed no significant abnormality.  Patient 

evaluated by surgery.  No surgical intervention was required.  Patient likely 

had enteritis with GERD.  Patient received IV antibiotic therapy.  His diet was 

advanced.  At discharge patient without any significant abdominal pain, nausea 

or vomiting.  At discharge he will continue with Cipro 500 mg 1 pill twice 

daily and Flagyl 500 mg 1 pill 3 times a day for 7 days.  Patient will also 

continue with Protonix 40 mg 1 pill once daily.  The patient will also be 

provided a limited supply of Zofran 4 mg 1 pill 3 times a day as needed for 

nausea and tramadol 50 mg 1 pill twice daily as needed for pain. Recommendation 

is for the patient to follow up with his PCP in 1 week to follow up this 

hospitalization.  Recommendation is for the patient to establish care with GI 

as an outpatient to further address.





Patient may have underlying GERD.  At discharge patient will continue with 

Protonix 40 mg 1 pill once daily.  Recommendation is for the patient to 

establish care with GI as an outpatient.  Patient may require further 

evaluation including EGD and colonoscopy.





Patient was found to be positive for THC.  THC education cessation addressed in 

detail.  Patient plans to quit.  


Vital Signs/Physical Exam: 














Temp Pulse Resp BP Pulse Ox


 


 97.8 F   53   20   127/64   98 


 


 10/07/18 04:00  10/07/18 04:00  10/07/18 04:00  10/07/18 04:00  10/07/18 04:00








General: Alert, In no apparent distress, Oriented x3, Cooperative


HEENT: Atraumatic, Mucous membr. moist/pink


Neck: Supple


Respiratory: Clear to auscultation bilaterally, Normal air movement


Cardiovascular: Normal pulses, Regular rate/rhythm


Gastrointestinal: Normal bowel sounds, Soft and benign, Non-distended, No 

tenderness, No masses, No rebound, No guarding


Musculoskeletal: No erythema, No tenderness, No warmth


Integumentary: No tenderness/swelling, No erythema, No warmth, No cyanosis


Neurological: Normal speech, Normal strength at 5/5 x4 extr, Normal tone, 

Normal affect


Lymphatics: No axilla or inguinal lymphadenopathy


Laboratory Data at Discharge: 














WBC  6.5 K/uL (4.3-10.9)  D 10/07/18  04:50    


 


Hgb  13.8 g/dL (13.6-17.9)   10/07/18  04:50    


 


Hct  39.2 % (39.6-49.0)  L  10/07/18  04:50    


 


Plt Count  296 K/uL (152-406)   10/07/18  04:50    


 


PT  13.9 SECONDS (9.5-12.5)  H  10/05/18  03:10    


 


INR  1.18   10/05/18  03:10    


 


Sodium  140 mmol/L (136-145)   10/07/18  04:50    


 


Potassium  4.0 mmol/L (3.5-5.1)   10/07/18  04:50    


 


BUN  5 mg/dL (7-18)  L  10/07/18  04:50    


 


Creatinine  0.90 mg/dL (0.55-1.3)   10/07/18  04:50    


 


Glucose  92 mg/dL ()   10/07/18  04:50    


 


Magnesium  2.0 mg/dL (1.8-2.4)   10/07/18  04:50    


 


Total Bilirubin  0.7 mg/dL (0.2-1.0)   10/07/18  04:50    


 


AST  12 U/L (15-37)  L  10/07/18  04:50    


 


ALT  18 U/L (12-78)   10/07/18  04:50    


 


Alkaline Phosphatase  44 U/L ()  L  10/07/18  04:50    


 


Lipase  72 U/L ()  L  10/05/18  03:10    








Home Medications: 








Ciprofloxacin HCl [Cipro 500 MG Tablet] 500 mg PO BID #14 tab 10/07/18 


Ondansetron HCl [Zofran] 4 mg PO TID PRN #10 tablet 10/07/18 


Pantoprazole [Protonix Tab*] 40 mg PO DAILYAC #30 tab 10/07/18 


metroNIDAZOLE [Flagyl] 500 mg PO Q8H #21 tablet 10/07/18 


traMADol HCL [Ultram*] 50 mg PO TID PRN #15 tab 10/07/18 





New Medications: 


Ciprofloxacin HCl [Cipro 500 MG Tablet] 500 mg PO BID #14 tab


metroNIDAZOLE [Flagyl] 500 mg PO Q8H #21 tablet


Ondansetron HCl [Zofran] 4 mg PO TID PRN #10 tablet


 PRN Reason: Nausea / Vomiting


Pantoprazole [Protonix Tab*] 40 mg PO DAILYAC #30 tab


traMADol HCL [Ultram*] 50 mg PO TID PRN #15 tab


 PRN Reason: Pain Mild


Patient Discharge Instructions: 1.  Patient will need to follow up with his PCP 

in 1 week to follow up this hospitalization.  2.  Patient presented with nausea

, vomiting and epigastric pain. Patient evaluated in the emergency room.  CT 

scan unremarkable.  Patient was admitted for further treatment.  Abdominal 

ultrasound showed no significant abnormality.  Patient evaluated by surgery.  

No surgical intervention was required.  Patient likely had enteritis with GERD.

  Patient received IV antibiotic therapy.  His diet was advanced.  At discharge 

patient without any significant abdominal pain, nausea or vomiting.  At 

discharge he will continue with Cipro 500 mg 1 pill twice daily and Flagyl 500 

mg 1 pill 3 times a day for 7 days.  Patient will also continue with Protonix 

40 mg 1 pill once daily.  The patient will also be provided a limited supply of 

Zofran 4 mg 1 pill 3 times a day as needed for nausea and tramadol 50 mg 1 pill 

twice daily as needed for pain. Recommendation is for the patient to follow up 

with his PCP in 1 week to follow up this hospitalization.  Recommendation is 

for the patient to establish care with GI as an outpatient to further address.  

3.  Patient may have underlying GERD.  At discharge patient will continue with 

Protonix 40 mg 1 pill once daily.  Recommendation is for the patient to 

establish care with GI as an outpatient.  Patient may require further 

evaluation including EGD and colonoscopy.  4.  Patient was found to be positive 

for THC.  THC education cessation addressed in detail.  Patient plans to quit.


Diet: Soft GI diet


Activity: Ad elia


Time spent managing pt's care (in minutes): 55

## 2018-11-28 ENCOUNTER — HOSPITAL ENCOUNTER (INPATIENT)
Dept: HOSPITAL 97 - ER | Age: 28
LOS: 2 days | Discharge: HOME | DRG: 392 | End: 2018-11-30
Attending: INTERNAL MEDICINE | Admitting: INTERNAL MEDICINE
Payer: SELF-PAY

## 2018-11-28 VITALS — BODY MASS INDEX: 16.3 KG/M2

## 2018-11-28 DIAGNOSIS — R79.89: ICD-10-CM

## 2018-11-28 DIAGNOSIS — F17.210: ICD-10-CM

## 2018-11-28 DIAGNOSIS — N17.9: ICD-10-CM

## 2018-11-28 DIAGNOSIS — Z86.73: ICD-10-CM

## 2018-11-28 DIAGNOSIS — F14.10: ICD-10-CM

## 2018-11-28 DIAGNOSIS — K52.9: Primary | ICD-10-CM

## 2018-11-28 DIAGNOSIS — F12.10: ICD-10-CM

## 2018-11-28 DIAGNOSIS — R73.9: ICD-10-CM

## 2018-11-28 DIAGNOSIS — G43.A1: ICD-10-CM

## 2018-11-28 LAB
ALBUMIN SERPL BCP-MCNC: 6.5 G/DL (ref 3.4–5)
ALP SERPL-CCNC: 74 U/L (ref 45–117)
ALT SERPL W P-5'-P-CCNC: 32 U/L (ref 12–78)
AST SERPL W P-5'-P-CCNC: 18 U/L (ref 15–37)
BUN BLD-MCNC: 29 MG/DL (ref 7–18)
GLUCOSE SERPLBLD-MCNC: 255 MG/DL (ref 74–106)
HCT VFR BLD CALC: 51.8 % (ref 39.6–49)
LIPASE SERPL-CCNC: 114 U/L (ref 73–393)
LYMPHOCYTES # SPEC AUTO: 1 K/UL (ref 0.7–4.9)
MCH RBC QN AUTO: 34.2 PG (ref 27–35)
MCV RBC: 99.5 FL (ref 80–100)
MORPHOLOGY BLD-IMP: (no result)
PMV BLD: 7.7 FL (ref 7.6–11.3)
POTASSIUM SERPL-SCNC: 3.8 MMOL/L (ref 3.5–5.1)
RBC # BLD: 5.2 M/UL (ref 4.33–5.43)

## 2018-11-28 PROCEDURE — 80076 HEPATIC FUNCTION PANEL: CPT

## 2018-11-28 PROCEDURE — 81015 MICROSCOPIC EXAM OF URINE: CPT

## 2018-11-28 PROCEDURE — 80048 BASIC METABOLIC PNL TOTAL CA: CPT

## 2018-11-28 PROCEDURE — 81003 URINALYSIS AUTO W/O SCOPE: CPT

## 2018-11-28 PROCEDURE — 74176 CT ABD & PELVIS W/O CONTRAST: CPT

## 2018-11-28 PROCEDURE — 80307 DRUG TEST PRSMV CHEM ANLYZR: CPT

## 2018-11-28 PROCEDURE — 83036 HEMOGLOBIN GLYCOSYLATED A1C: CPT

## 2018-11-28 PROCEDURE — 83690 ASSAY OF LIPASE: CPT

## 2018-11-28 PROCEDURE — 83605 ASSAY OF LACTIC ACID: CPT

## 2018-11-28 PROCEDURE — 82962 GLUCOSE BLOOD TEST: CPT

## 2018-11-28 PROCEDURE — 36415 COLL VENOUS BLD VENIPUNCTURE: CPT

## 2018-11-28 PROCEDURE — 82805 BLOOD GASES W/O2 SATURATION: CPT

## 2018-11-28 PROCEDURE — 87040 BLOOD CULTURE FOR BACTERIA: CPT

## 2018-11-28 PROCEDURE — 85025 COMPLETE CBC W/AUTO DIFF WBC: CPT

## 2018-11-28 PROCEDURE — 82550 ASSAY OF CK (CPK): CPT

## 2018-11-28 PROCEDURE — 99285 EMERGENCY DEPT VISIT HI MDM: CPT

## 2018-11-29 VITALS — OXYGEN SATURATION: 100 %

## 2018-11-29 LAB
BUN BLD-MCNC: 38 MG/DL (ref 7–18)
COHGB MFR BLDA: 1.1 % (ref 0–1.5)
GLUCOSE SERPLBLD-MCNC: 128 MG/DL (ref 74–106)
METHAMPHET UR QL SCN: NEGATIVE
OXYHGB MFR BLDA: 94.9 % (ref 94–97)
POTASSIUM SERPL-SCNC: 4.2 MMOL/L (ref 3.5–5.1)
SAO2 % BLDA: 97.1 % (ref 92–98.5)
THC SERPL-MCNC: POSITIVE NG/ML

## 2018-11-29 RX ADMIN — MORPHINE SULFATE PRN MG: 2 INJECTION, SOLUTION INTRAMUSCULAR; INTRAVENOUS at 21:18

## 2018-11-29 RX ADMIN — METRONIDAZOLE SCH MLS: 500 INJECTION, SOLUTION INTRAVENOUS at 09:04

## 2018-11-29 RX ADMIN — Medication SCH: at 21:00

## 2018-11-29 RX ADMIN — SODIUM CHLORIDE SCH MLS: 0.9 INJECTION, SOLUTION INTRAVENOUS at 13:13

## 2018-11-29 RX ADMIN — HEPARIN SODIUM SCH UNIT: 5000 INJECTION, SOLUTION INTRAVENOUS; SUBCUTANEOUS at 21:17

## 2018-11-29 RX ADMIN — MORPHINE SULFATE PRN MG: 2 INJECTION, SOLUTION INTRAMUSCULAR; INTRAVENOUS at 09:05

## 2018-11-29 RX ADMIN — HEPARIN SODIUM SCH UNIT: 5000 INJECTION, SOLUTION INTRAVENOUS; SUBCUTANEOUS at 09:04

## 2018-11-29 RX ADMIN — METRONIDAZOLE SCH MLS: 500 INJECTION, SOLUTION INTRAVENOUS at 17:00

## 2018-11-29 RX ADMIN — SODIUM CHLORIDE SCH MG: 0.9 INJECTION, SOLUTION INTRAVENOUS at 13:11

## 2018-11-29 RX ADMIN — Medication SCH ML: at 09:05

## 2018-11-29 RX ADMIN — SODIUM CHLORIDE SCH MLS: 0.9 INJECTION, SOLUTION INTRAVENOUS at 03:44

## 2018-11-29 RX ADMIN — HUMAN INSULIN SCH: 100 INJECTION, SOLUTION SUBCUTANEOUS at 12:00

## 2018-11-29 RX ADMIN — HUMAN INSULIN SCH: 100 INJECTION, SOLUTION SUBCUTANEOUS at 06:00

## 2018-11-29 RX ADMIN — MORPHINE SULFATE PRN MG: 2 INJECTION, SOLUTION INTRAMUSCULAR; INTRAVENOUS at 03:45

## 2018-11-29 RX ADMIN — MORPHINE SULFATE PRN MG: 2 INJECTION, SOLUTION INTRAMUSCULAR; INTRAVENOUS at 16:11

## 2018-11-29 RX ADMIN — HUMAN INSULIN SCH: 100 INJECTION, SOLUTION SUBCUTANEOUS at 18:00

## 2018-11-29 RX ADMIN — ONDANSETRON PRN MG: 2 INJECTION INTRAMUSCULAR; INTRAVENOUS at 03:44

## 2018-11-29 NOTE — RAD REPORT
EXAM DESCRIPTION:  CT - Abdomen   Pelvis Wo Contrast - 11/29/2018 4:35 am

 

CLINICAL HISTORY:  Abdominal pain, left flank pain

 

 A preliminary report was provided at the time of the study and reviewed prior to final report.

 

COMPARISON:  CT study October 2018

 

TECHNIQUE:  Axial 5 mm thick CT imaging of the abdomen and pelvis was performed without IV contrast. 
No IV contrast was given because of allergy, abnormal renal function, patient refusal or physician re
quest. No oral contrast administered.

 

All CT scans are performed using dose optimization technique as appropriate and may include automated
 exposure control or mA/KV adjustment according to patient size.

 

FINDINGS:  No suspicious findings in the lung bases.

 

The liver, spleen and pancreas show no suspicious findings on non-contrast imaging. Gallbladder and b
iliary tree are also without suspicious finding.

 

No hydronephrosis or suspicious renal mass. Patient has punctate nonobstructing caliceal calculi. No 
perinephric stranding. No significant adrenal finding. Isodense renal masses and pyelonephritis canno
t be excluded in the absence of IV contrast. Urinary bladder is contracted limiting assessment. No bl
adder calculi seen. Multiple pelvic floor phleboliths are present.

 

No dilated bowel loops or bowel wall thickening. Colon is decompressed. No active GI process identifi
able. No free air, free fluid or inflammatory stranding. No hernia, mass or bulky lymphadenopathy.

 

No suspicious bony findings.

 

 

IMPRESSION:  Non-contrast enhanced CT abdomen and pelvis imaging show no significant or suspicious fi
nding.

Patient has punctate nonobstructing renal calyx calculi.

 

Full assessment is limited is the absence of IV contrast.

## 2018-11-29 NOTE — ER
Nurse's Notes                                                                                     

 [facility]                                                                

Name: Zhang Quijano                                                                                

Age: 28 yrs                                                                                       

Sex: Male                                                                                         

: 1990                                                                                   

MRN: [id number]                                                                                   

Arrival Date: 2018                                                                          

Time: 20:56                                                                                       

Account#: [id number]                                                                            

Bed 5                                                                                             

Private MD:                                                                                       

Diagnosis: Diabetes;Colitis;Acute renal failure;Intractable nausea and vomiting                   

                                                                                                  

Presentation:                                                                                     

                                                                                             

21:09 Presenting complaint: Patient states: N/V since yesterday. Transition of care: patient  aj  

      was not received from another setting of care. Onset of symptoms was 2018.     

      Risk Assessment: Do you want to hurt yourself or someone else? Patient reports no           

      desire to harm self or others. Initial Sepsis Screen: Does the patient meet any 2           

      criteria? No. Patient's initial sepsis screen is negative. Does the patient have a          

      suspected source of infection? No. Patient's initial sepsis screen is negative. Care        

      prior to arrival: None.                                                                     

21:09 Method Of Arrival: Wheelchair                                                           aj  

21:09 Acuity: SUZETTE 3                                                                           aj  

                                                                                                  

Triage Assessment:                                                                                

21:11 General: Appears in no apparent distress. uncomfortable, Behavior is anxious. Pain:     aj  

      Complains of pain in chest and abdomen. Neuro: Level of Consciousness is awake, alert,      

      obeys commands, Oriented to person, place, time, situation, Appropriate for age.            

      Respiratory: Airway is patent Respiratory effort is even, unlabored, Respiratory            

      pattern is regular, symmetrical. GI: Reports lower abdominal pain, upper abdominal          

      pain, nausea, vomiting. Derm: Skin is intact, is healthy with good turgor, Skin is          

      pink, warm \T\ dry. normal.                                                                 

                                                                                                  

Historical:                                                                                       

- Allergies:                                                                                      

21:11 Neurontin;                                                                              aj  

- Home Meds:                                                                                      

21:11 None [Active];                                                                          aj  

- PMHx:                                                                                           

21:11 Anxiety; Back pain; Kidney stones;                                                      aj  

- PSHx:                                                                                           

21:11 Appendectomy;                                                                           aj  

                                                                                                  

- Immunization history:: Adult Immunizations unknown.                                             

- Social history:: Smoking status: Patient uses tobacco products, smokes one pack                 

  cigarettes per day. Patient uses street drugs, marijuana.                                       

- Ebola Screening: : Patient negative for fever greater than or equal to 101.5 degrees            

  Fahrenheit, and additional compatible Ebola Virus Disease symptoms Patient denies               

  exposure to infectious person Patient denies travel to an Ebola-affected area in the            

  21 days before illness onset No symptoms or risks identified at this time.                      

                                                                                                  

                                                                                                  

Screenin:53 Abuse screen: Denies threats or abuse. Nutritional screening: No deficits noted.        jd3 

      Tuberculosis screening: No symptoms or risk factors identified. Fall Risk Ambulatory        

      Aid- None/Bed Rest/Nurse Assist (0 pts). Gait- Normal/Bed Rest/Wheelchair (0 pts)           

      Mental Status- Oriented to own ability (0 pts). Total Sheldon Fall Scale indicates No         

      Risk (0-24 pts).                                                                            

                                                                                                  

Assessment:                                                                                       

21:51 General: Appears uncomfortable, Behavior is calm, cooperative, appropriate for age.     jd3 

      Pain: Complains of pain in anterior aspect of left lateral abdomen Quality of pain is       

      described as sharp. Neuro: Level of Consciousness is awake, alert, obeys commands,          

      Oriented to person, place, time, situation. Cardiovascular: Capillary refill < 3            

      seconds Patient's skin is warm and dry. Respiratory: Airway is patent Respiratory           

      effort is even, unlabored, Respiratory pattern is regular, symmetrical, Breath sounds       

      are clear bilaterally. GI: Abdomen is round non-distended, Bowel sounds present X 4         

      quads. Abdomen is tender to palpation in anterior aspect of left lateral abdomen            

      Reports lower abdominal pain, nausea, vomiting. : No signs and/or symptoms were           

      reported regarding the genitourinary system. EENT: No signs and/or symptoms were            

      reported regarding the EENT system. Derm: Skin is intact, Skin is dry, Skin is normal,      

      Skin temperature is warm. Musculoskeletal: Circulation, motion, and sensation intact.       

      Range of motion: intact in all extremities.                                                 

22:36 Reassessment: pt vomiting, provider notified with new verbal orders given.              ak1 

                                                                                             

00:50 Reassessment: Patient appears in no apparent distress at this time. No changes from     jd3 

      previously documented assessment. Patient and/or family updated on plan of care and         

      expected duration. Pain level reassessed.                                                   

02:59 Reassessment: Patient appears in no apparent distress at this time. Patient and/or      jd3 

      family updated on plan of care and expected duration. Pain level reassessed. Patient is     

      alert, oriented x 3, equal unlabored respirations, skin warm/dry/pink.                      

03:00 Reassessment: Patient and/or family updated on plan of care and expected duration. Pain ea  

      level reassessed. Patient is alert, oriented x 3, equal unlabored respirations, skin        

      warm/dry/pink. Report called to receiving nurse. Patient states symptoms have improved.     

                                                                                                  

Vital Signs:                                                                                      

                                                                                             

21:11  / 89; Pulse 113; Resp 20; Temp 97.0; Pulse Ox 98% on R/A; Weight 63.5 kg; Height aj  

      5 ft. 11 in. (180.34 cm);                                                                   

22:53  / 116; Pulse 85; Resp 16; Pulse Ox 99% ;                                         ea  

                                                                                             

00:51  / 87; Pulse 64; Resp 19 S; Pulse Ox 98% on R/A;                                  jd3 

02:59  / 116; Pulse 58; Resp 16 S; Pulse Ox 98% on R/A;                                 jd3 

                                                                                             

21:11 Body Mass Index 19.53 (63.50 kg, 180.34 cm)                                             aj  

                                                                                                  

ED Course:                                                                                        

                                                                                             

20:56 Patient arrived in ED.                                                                  ds1 

21:09 Montserrat Vivar, RN is Primary Nurse.                                                     aj  

21:10 Triage completed.                                                                       aj  

21:11 Arm band placed on right wrist. Patient placed in waiting room, Patient notified of       

      wait time.                                                                                  

21:40 Inserted saline lock: 22 gauge in right forearm, using aseptic technique. Blood         ea  

      collected.                                                                                  

21:41 Ben Stacy, RN is Primary Nurse.                                                  jd3 

21:42 Deepak Villagran MD is Attending Physician.                                             ps1 

21:54 Patient has correct armband on for positive identification. Bed in low position. Call   j 

      light in reach. Side rails up X 1. Adult w/ patient.                                        

22:16 CT completed. Patient moved back from CT.                                               nj  

22:19 CT Abd/Pelvis - Without Cont In Process Unspecified.                                    EDMS

22:27 Notified ED physician of a critical lab result(s). WBC of 24.8.                         jd3 

23:00 Primary Nurse role handed off by Ben Stacy, LEANDER                                   ea  

23:00 Torri Turner, LEANDER is Primary Nurse.                                                    ea  

                                                                                             

01:07 Jeannine Raya MD is Hospitalizing Provider.                                          ps1 

03:00 No provider procedures requiring assistance completed. Patient admitted, IV remains in  jd3 

      place.                                                                                      

                                                                                                  

Administered Medications:                                                                         

                                                                                             

02:47 Drug: Rocephin - (cefTRIAXone) 1 grams Route: IVPB; Infused Over: 30 mins; Site: right  jd3 

      wrist;                                                                                      

                                                                                             

02:58 Follow up: Response: No adverse reaction; IV Status: Completed infusion                 jd3 

                                                                                             

22:00 Drug: TORadol 30 mg Route: IVP; Site: right forearm;                                    ea  

                                                                                             

02:57 Follow up: Response: No adverse reaction                                                jd3 

                                                                                             

22:00 Drug: Zofran 4 mg Route: IVP; Site: right forearm;                                      ea  

                                                                                             

02:57 Follow up: Response: No adverse reaction                                                jd3 

                                                                                             

22:00 Drug: NS 0.9% 1000 ml Route: IV; Rate: 1 bolus; Site: right forearm;                    ea  

                                                                                             

02:58 Follow up: Response: No adverse reaction; IV Status: Completed infusion; IV Intake:     jd3 

      1000ml                                                                                      

                                                                                             

22:36 Drug: Zofran 4 mg Route: IVP; Site: right wrist;                                        ak1 

                                                                                             

02:57 Follow up: Response: No adverse reaction                                                jd3 

00:37 Drug: HALdol 2.5 mg Route: IVP; Site: right wrist;                                      jd3 

02:57 Follow up: Response: No adverse reaction                                                jd3 

00:38 Drug: Reglan 10 mg Route: IVP; Site: right forearm;                                     jd3 

02:57 Follow up: Response: No adverse reaction                                                jd3 

02:56 Drug: Cipro 400 mg Volume: 200 ml; Route: IVPB; Infused Over: 60 mins; Site: right      jd3 

      wrist;                                                                                      

02:58 Follow up: Response: No adverse reaction; IV Status: Infusion continued upon admission  jd3 

02:56 Drug: Flagyl 500 mg Volume: 100 ml; Route: IVPB; Rate: 200 ml/hr; Infused Over: 30      jd3 

      mins; Site: right wrist;                                                                    

02:59 Follow up: Response: No adverse reaction; IV Status: Completed infusion                 jd3 

                                                                                                  

                                                                                                  

Intake:                                                                                           

02:58 IV: 1000ml; Total: 1000ml.                                                              jd3 

                                                                                                  

Outcome:                                                                                          

01:08 Decision to Hospitalize by Provider.                                                    ps1 

03:01 Admitted to Med/surg accompanied by tech, via wheelchair, room 421, with chart, Report  jd3 

      called to  Nubia TABOR                                                                         

03:01 Condition: stable                                                                           

03:18 Patient left the ED.                                                                    vickey  

                                                                                                  

Signatures:                                                                                       

Dispatcher [employer]                           Montserrat Denis RN RN aj Sanford, Demi                                dsDeya Ervin RN                       RN   ak1                                                  

Chester Thomson Elena, RN RN ea Davies, Jonathon, RN RN jd3 Singer, Phillip, MD MD   ps1                                                  

                                                                                                  

Corrections: (The following items were deleted from the chart)                                    

                                                                                             

21:13 21:11 Arm band placed on right wrist. Patient placed aj                                 aj  

                                                                                             

03:18 03:16 Reassessment: Patient and/or family updated on plan of care and expected          ea  

      duration. Pain level reassessed. Patient is alert, oriented x 3, equal unlabored            

      respirations, skin warm/dry/pink. Report called to receiving nurse. Patient states          

      symptoms have improved. ea                                                                  

                                                                                                  

**************************************************************************************************

## 2018-11-29 NOTE — PN
Date of Progress Note:  11/29/2018



Subjective:  Patient was seen and examined.  Chart reviewed and case discussed with RN and Dr. Samra brown.  The patient is still having significant amount of abdominal pain, nausea, and somewhat tremulou
s.



Review of Systems:

Negative except as above.



Medications:  List reviewed.



Physical Examination:

Vital Signs:  Temperature 99.6, heart rate 68, blood pressure 151/81, respirations 18, O2 97% on room
 air. 

General:  Awake, alert, oriented x3.  Moderate distress due to pain, cachectic male.  BMI 16. 

CV:  S1, S2.  Regular rate and rhythm.  Peripheral pulses present.  No murmurs. 

Respiratory:  Moving air well bilaterally.  No wheezing. 

Gastrointestinal:  Abdomen is soft.  Voluntary guarding is present.  No rigidity.  Bowel sounds hypoa
ctive.  No distention.  Tenderness to palpation in the epigastric region.  No rebound. 

Extremities:  No clubbing, cyanosis, edema. 

Neurologic:  Nonfocal.  Cranial nerves 2 through 12 intact grossly.  Speech is normal.  Strength is s
ymmetric in bilateral upper and lower extremities. 

Skin:  No rashes.  Normal skin turgor.



Laboratory Data:  Sodium 136, potassium 4.2, chloride 103, CO2 24, BUN 38, creatinine 2, glucose 128.
  Lactic acid 1.9, calcium 8.7.  CK level 74.  WBC 24.8 from 11/28/2018, platelet 477.  Blood culture
s pending.  CT scan of the abdomen and pelvis shows no significant or suspicious finding, has punctat
e nonobstructing renal michael calculi.



Assessment And Plan:  A 28-year-old male with

1.Acute colitis.  We will continue on IV antibiotics and follow up on cultures.  The patient's white
 count is severely elevated at 24, with bandemia.

2.Acute kidney injury, likely prerenal azotemia, improving with IV fluids.  Repeat creatinine is imp
roved.  Nephrology has been consulted.

3.Acute generalized abdominal pain secondary to colitis.

4.Intractable nausea and vomiting.  Will advance diet as tolerated.  Continue antiemetics.

5.Hyperglycemia, likely acute phase reactant.  We will check A1c if continues to be elevated.

6.Gastrointestinal and deep venous thrombosis prophylaxis addressed.  The patient is on heparin.  We
 will add IV PPI.



Plan:  

1.Continue conservative treatment.

2.Continue IV antibiotics.

3.Likely discharge in next 24 to 48 hours depending on clinical response.





SA/MODL

DD:  11/29/2018 16:48:51Voice ID:  [id number]

DT:  11/29/2018 22:05:24Report ID:  [id number]

## 2018-11-29 NOTE — EDPHYS
Physician Documentation                                                                           

 [facility]                                                                

Name: Zhang Quijano                                                                                

Age: 28 yrs                                                                                       

Sex: Male                                                                                         

: 1990                                                                                   

MRN: [id number]                                                                                   

Arrival Date: 2018                                                                          

Time: 20:56                                                                                       

Account#: [id number]                                                                            

Bed 5                                                                                             

Private MD:                                                                                       

ED Physician Deepak Villagran                                                                      

HPI:                                                                                              

                                                                                             

22:00 This 28 yrs old  Male presents to ER via Wheelchair with complaints of         ps1 

      Vomiting.                                                                                   

22:00 patient with 2 days of flank pain. localized to left flank. Assoicated with nausea and  ps1 

      vomiting. Denies fever. Pain rated as moderate. Never had symptoms like this before. .      

                                                                                                  

Historical:                                                                                       

- Allergies:                                                                                      

21:11 Neurontin;                                                                              aj  

- Home Meds:                                                                                      

21:11 None [Active];                                                                          aj  

- PMHx:                                                                                           

21:11 Anxiety; Back pain; Kidney stones;                                                      aj  

- PSHx:                                                                                           

21:11 Appendectomy;                                                                           aj  

                                                                                                  

- Immunization history:: Adult Immunizations unknown.                                             

- Social history:: Smoking status: Patient uses tobacco products, smokes one pack                 

  cigarettes per day. Patient uses street drugs, marijuana.                                       

- Ebola Screening: : Patient negative for fever greater than or equal to 101.5 degrees            

  Fahrenheit, and additional compatible Ebola Virus Disease symptoms Patient denies               

  exposure to infectious person Patient denies travel to an Ebola-affected area in the            

  21 days before illness onset No symptoms or risks identified at this time.                      

                                                                                                  

                                                                                                  

ROS:                                                                                              

22:00 Constitutional: Negative for fever, chills, and weight loss, Eyes: Negative for injury, ps1 

      pain, redness, and discharge, Cardiovascular: Negative for chest pain, palpitations,        

      and edema, Respiratory: Negative for shortness of breath, cough, wheezing, and              

      pleuritic chest pain, MS/Extremity: Negative for injury and deformity, Neuro: Negative      

      for headache, weakness, numbness, tingling, and seizure, Psych: Negative for                

      depression, anxiety, suicide ideation, homicidal ideation, and hallucinations.              

22:00 Abdomen/GI: Positive for abdominal pain.                                                    

                                                                                                  

Exam:                                                                                             

22:00 Constitutional:  This is a well developed, well nourished patient who is awake, alert,  ps1 

      and in no acute distress. Head/Face:  Normocephalic, atraumatic. Eyes:  Pupils equal        

      round and reactive to light, extra-ocular motions intact.  Lids and lashes normal.          

      Conjunctiva and sclera are non-icteric and not injected. Chest/axilla:  Normal chest        

      wall appearance and motion.  Nontender with no deformity.  No lesions are appreciated.      

      Cardiovascular:  Regular rate and rhythm.  No gallops, murmurs, or rubs.  Normal PMI,       

      no JVD.  No pulse deficits. Respiratory:  Lungs have equal breath sounds bilaterally,       

      clear to auscultation and percussion.  No rales, rhonchi or wheezes noted.  No              

      increased work of breathing, no retractions or nasal flaring. Skin:  Warm, dry with         

      normal turgor.  Normal color with no rashes, no lesions, and no evidence of cellulitis.     

      MS/ Extremity:  Pulses equal, no cyanosis.  Neurovascular intact.  Full, normal range       

      of motion. Neuro:  Awake and alert, GCS 15, oriented to person, place, time, and            

      situation.  Cranial nerves II-XII grossly intact. Sensory grossly intact.                   

22:00 Abdomen/GI: Inspection: abdomen appears normal, Palpation: mild abdominal tenderness,       

      in the anterior aspect of left lateral abdomen.                                             

                                                                                                  

Vital Signs:                                                                                      

21:11  / 89; Pulse 113; Resp 20; Temp 97.0; Pulse Ox 98% on R/A; Weight 63.5 kg; Height aj  

      5 ft. 11 in. (180.34 cm);                                                                   

22:53  / 116; Pulse 85; Resp 16; Pulse Ox 99% ;                                         ea  

                                                                                             

00:51  / 87; Pulse 64; Resp 19 S; Pulse Ox 98% on R/A;                                  jd3 

02:59  / 116; Pulse 58; Resp 16 S; Pulse Ox 98% on R/A;                                 jd3 

                                                                                             

21:11 Body Mass Index 19.53 (63.50 kg, 180.34 cm)                                               

                                                                                                  

MDM:                                                                                              

                                                                                             

22:38 Patient medically screened.                                                             [facility] 

                                                                                                  

                                                                                             

21:49 Order name: Basic Metabolic Panel; Complete Time: 22:39                                 ea  

                                                                                             

01:03 Interpretation: Abnormal: GLUC 255.                                                     ps1 

                                                                                             

21:49 Order name: CBC with Diff; Complete Time: 23:24                                         ea  

                                                                                             

21:49 Order name: Creatinine for Radiology; Complete Time: 22:39                              ea  

                                                                                             

21:49 Order name: Hepatic Function; Complete Time: 22:39                                      ea  

                                                                                             

21:49 Order name: Lipase; Complete Time: 22:39                                                ea  

                                                                                             

22:27 Order name: Manual Differential; Complete Time: 23:24                                   EDMS

11/28                                                                                             

22:00 Order name: CT Abd/Pelvis - Without Cont                                                ps1 

                                                                                             

01:04 Order name: ABG; Complete Time: 02:00                                                   ps1 

                                                                                             

01:51 Order name: Blood Culture                                                               EDMS

                                                                                             

21:49 Order name: IV Saline Lock; Complete Time: 22:11                                        ea  

                                                                                             

21:49 Order name: Labs collected and sent; Complete Time: 22:11                               ea  

                                                                                                  

Administered Medications:                                                                         

02:47 Drug: Rocephin - (cefTRIAXone) 1 grams Route: IVPB; Infused Over: 30 mins; Site: right  jd3 

      wrist;                                                                                      

                                                                                             

02:58 Follow up: Response: No adverse reaction; IV Status: Completed infusion                 [facility] 

                                                                                             

22:00 Drug: TORadol 30 mg Route: IVP; Site: right forearm;                                    ea  

                                                                                             

02:57 Follow up: Response: No adverse reaction                                                [facility] 

                                                                                             

22:00 Drug: Zofran 4 mg Route: IVP; Site: right forearm;                                      ea  

                                                                                             

02:57 Follow up: Response: No adverse reaction                                                [facility] 

                                                                                             

22:00 Drug: NS 0.9% 1000 ml Route: IV; Rate: 1 bolus; Site: right forearm;                    ea  

                                                                                             

02:58 Follow up: Response: No adverse reaction; IV Status: Completed infusion; IV Intake:     jd3 

      1000ml                                                                                      

                                                                                             

22:36 Drug: Zofran 4 mg Route: IVP; Site: right wrist;                                        ak1 

                                                                                             

02:57 Follow up: Response: No adverse reaction                                                jd3 

00:37 Drug: HALdol 2.5 mg Route: IVP; Site: right wrist;                                      jd3 

02:57 Follow up: Response: No adverse reaction                                                jd3 

00:38 Drug: Reglan 10 mg Route: IVP; Site: right forearm;                                     jd3 

02:57 Follow up: Response: No adverse reaction                                                jd3 

02:56 Drug: Cipro 400 mg Volume: 200 ml; Route: IVPB; Infused Over: 60 mins; Site: right      jd3 

      wrist;                                                                                      

02:58 Follow up: Response: No adverse reaction; IV Status: Infusion continued upon admission  jd3 

02:56 Drug: Flagyl 500 mg Volume: 100 ml; Route: IVPB; Rate: 200 ml/hr; Infused Over: 30      jd3 

      mins; Site: right wrist;                                                                    

02:59 Follow up: Response: No adverse reaction; IV Status: Completed infusion                 jd3 

                                                                                                  

                                                                                                  

Disposition:                                                                                      

18 01:08 Hospitalization ordered by Jeannine Raya for Inpatient Admission. Preliminary    

  diagnosis are Diabetes, Colitis, Acute renal failure, Intractable nausea and                    

  vomiting.                                                                                       

- Bed requested for Telemetry/MedSurg (Inpatient).                                                

- Status is Inpatient Admission.                                                              ea  

- Condition is Fair.                                                                              

- Problem is new.                                                                                 

- Symptoms have improved.                                                                         

UTI on Admission? No                                                                              

                                                                                                  

                                                                                                  

                                                                                                  

Signatures:                                                                                       

Dispatcher [employer]                           EDMontserrat Burciaga, RN                       RN   Deya Farmer RN                       RN   akMallika Miranda, RN                       RN   Torri Tompkins, RN                      RN   Ben Roque, RN                    LEANDER   jDeepak Marshall MD MD   ps1                                                  

                                                                                                  

Corrections: (The following items were deleted from the chart)                                    

02:00 01:08 Hospitalization Ordered by Jeannine Raya MD for Inpatient Admission. Preliminary cg  

      diagnosis is Diabetes; Colitis; Acute renal failure; Intractable nausea and vomiting.       

      Bed requested for Telemetry/MedSurg (Inpatient). Status is Inpatient Admission.             

      Condition is Fair. Problem is new. Symptoms have improved. UTI on Admission? No. ps1        

03:16  22:00 Urine Dipstick-Ancillary ordered. ps1                                       ea  

                                                                                             

03:18 02:00 2018 01:08 Hospitalization Ordered by Jeannine Raya MD for Inpatient       ea  

      Admission. Preliminary diagnosis is Diabetes; Colitis; Acute renal failure; Intractable     

      nausea and vomiting. Bed requested for Telemetry/MedSurg (Inpatient). Status is             

      Inpatient Admission. Condition is Fair. Problem is new. Symptoms have improved. UTI on      

      Admission? No. cg                                                                           

                                                                                                  

**************************************************************************************************

## 2018-11-29 NOTE — P.CNS
Date of Consult: 11/29/18


Reason for Consult: LUIS 


Chief Complaint: colitis


History of Present Illness: 





A 28 years old male with history of anxiety disorder, tobacco abuse


Pt was admitted for recurret episodes of vomiting and abdominal pain of 2 days 

duration 


no diarrhea or constipation


denied NSAID or contrast exposure 


taking opiates occasionally for back pain 








Allergies





naproxen Allergy (Verified 10/05/18 07:56)


 Unknown





Home Medications: 








NK [No Home Meds]  11/29/18 








- Past Medical/Surgical History


Diabetic: No


-: wrist fx


-: TIA with aphasia (7/15)


-: anxiety


-: back pain


-: kidney stones


-: R wrist fx


-: appendectomy





- Family History


  ** Mother


Medical History: Cancer


Notes: lung cx





- Social History


Smoking Status: Current every day smoker


Alcohol use: No


CD- Drugs: No


Caffeine use: Yes


Place of Residence: Home





Physical Examination














Temp Pulse Resp BP Pulse Ox


 


 99.8 F   87   18   142/89 H  97 


 


 11/29/18 20:00  11/29/18 20:00  11/29/18 20:00  11/29/18 20:00  11/29/18 20:00








General: Oriented x3, Moderate distress


HEENT: Atraumatic


Neck: Supple, Without JVD or thyroid abnormality


Respiratory: Clear to auscultation bilaterally, Normal air movement


Cardiovascular: No edema, Normal pulses, Regular rate/rhythm, Normal S1 S2


Laboratory Data (last 24 hrs)





11/28/18 21:50: Creatinine 3.40 H


11/28/18 21:50: WBC 24.8 H*, Hgb 17.8, Hct 51.8 H, Plt Count 477 H


11/28/18 21:50: Sodium 133 L, Potassium 3.8, BUN 29 H, Creatinine 3.50 H, 

Glucose 255 H, Total Bilirubin 0.8, AST 18, ALT 32, Alkaline Phosphatase 74, 

Lipase 114








- Problems


(1) Acute renal injury


Current Visit: Yes   Status: Acute   





(2) Abdominal pain


Onset Date: 10/19/15   Current Visit: No   Status: Acute   


Qualifiers: 


   Abdominal location: epigastric   Qualified Code(s): R10.13 - Epigastric pain

   





(3) Nausea & vomiting


Onset Date: 10/19/15   Current Visit: No   Status: Acute   


Qualifiers: 


   Vomiting type: unspecified   Vomiting Intractability: intractable   

Qualified Code(s): R11.2 - Nausea with vomiting, unspecified   


Conclusions/Impression: 





A 28 years old male with history of anxiety disorder, tobacco abuse


Pt was admitted for recurret episodes of vomiting and abdominal pain of 2 days 

duration 


no diarrhea or constipation


denied NSAID or contrast exposure 


taking opiates occasionally for back pain 








LUIS 


likely due to dehydration 


Improved on IVF 


COnt IVF 


abd Ct : no hydo, lt non-obstructing renal stone 


will get UA 


Cont IVF 








ABd pain 


Utox +ve for cocaine and cannbiniodes


likely due to withdrawal 








Hypercalcemia


due to dehydration 


resolved 





hyperglycemia 


A1c WNL

## 2018-11-29 NOTE — P.HP
Certification for Inpatient


Patient admitted to: Inpatient


With expected LOS: >2 Midnights


Practitioner: I am a practitioner with admitting privileges, knowledge of 

patient current condition, hospital course, and medical plan of care.


Services: Services provided to patient in accordance with Admission 

requirements found in Title 42 Section 412.3 of the Code of Federal Regulations





Patient History


Date of Service: 11/29/18


Reason for admission: colitis


History of Present Illness: 





Mr Quijano is a 28 years old male with history of anxiety disorder, tobacco abuse

, who start about 2 days ago with diffuse abdominal pain associated with nausea 

and vomiting. He denied any fever or chills. No diarrhea either. The patient 

has not been able to eat or keep fluids down. In ED he is abebrile, lab work 

significantly abnormal, WBC count 24.8K with 1% bands, hyperglycemia and 

abnormal renal function. CT abd/pelvis remarkable for a left non-obstructive 

renal stone, and signs of colitis.


Allergies





naproxen Allergy (Verified 10/05/18 07:56)


 Unknown





Home Medications: 








Ciprofloxacin HCl [Cipro 500 MG Tablet] 500 mg PO BID #14 tab 10/07/18 


Ondansetron HCl [Zofran] 4 mg PO TID PRN #10 tablet 10/07/18 


Pantoprazole [Protonix Tab*] 40 mg PO DAILYAC #30 tab 10/07/18 


metroNIDAZOLE [Flagyl] 500 mg PO Q8H #21 tablet 10/07/18 


traMADol HCL [Ultram*] 50 mg PO TID PRN #15 tab 10/07/18 








- Past Medical/Surgical History


Diabetic: No


-: wrist fx


-: TIA with aphasia (7/15)


-: R wrist fx


-: appendectomy





- Family History


  ** Mother


-: Cancer


Notes: lung cx





- Social History


Smoking Status: Current every day smoker


Counseled patient to stop smoking for: less than 10 minutes


Alcohol use: No


CD- Drugs: No


Caffeine use: Yes


Place of Residence: Home





Review of Systems


10-point ROS is otherwise unremarkable





Physical Examination





- Physical Exam


General: Alert, Moderate distress (due to abdominal pain)


HEENT: Atraumatic, PERRLA, Mucous membr. moist/pink, EOMI, Sclerae nonicteric


Neck: Supple, 2+ carotid pulse no bruit, No LAD, Without JVD or thyroid 

abnormality


Respiratory: Clear to auscultation bilaterally, Normal air movement


Cardiovascular: Regular rate/rhythm, Normal S1 S2


Gastrointestinal: Hypoactive, Tenderness (diffuse tenderness to palpation)


Musculoskeletal: No tenderness


Integumentary: No rashes


Neurological: Normal speech, Normal strength at 5/5 x4 extr, Normal tone, 

Normal affect


Lymphatics: No axilla or inguinal lymphadenopathy





- Studies


Laboratory Data (last 24 hrs)





11/28/18 21:50: Creatinine 3.40 H


11/28/18 21:50: WBC 24.8 H*, Hgb 17.8, Hct 51.8 H, Plt Count 477 H


11/28/18 21:50: Sodium 133 L, Potassium 3.8, BUN 29 H, Creatinine 3.50 H, 

Glucose 255 H, Total Bilirubin 0.8, AST 18, ALT 32, Alkaline Phosphatase 74, 

Lipase 114








Assessment and Plan





- Problems (Diagnosis)


(1) Colitis


Current Visit: Yes   Status: Acute   





(2) Acute renal injury


Current Visit: Yes   Status: Acute   





(3) Abdominal pain


Onset Date: 10/08/18   Current Visit: No   Status: Acute   


Qualifiers: 


   Abdominal location: generalized   Qualified Code(s): R10.84 - Generalized 

abdominal pain   





(4) Nausea & vomiting


Onset Date: 10/19/15   Current Visit: No   Status: Acute   


Qualifiers: 


   Vomiting type: unspecified   Vomiting Intractability: intractable   

Qualified Code(s): R11.2 - Nausea with vomiting, unspecified   





- Plan





The patient will be admitted to the hospital due to colitis. He has also acute 

renal injury, possible due to volume depletion. He has elevated serum proteins, 

hyperalbuminemia and hypercalcemia. Will re-evaluate chemical panel after fluid 

repletion, if remain elevated, consider multiple myeloma as part of 

differential diagnosis. Will order IV Cipro and Flagyl, aggressive IV fluids, 

symptomatic medication for N/V and abdominal pain.





- Advance Directives


Does patient have a Living Will: No


Does patient have a Durable POA for Healthcare: No





- Code Status/Comfort Care


Code Status Assessed: Yes


Code Status: Full Code

## 2018-11-30 VITALS — DIASTOLIC BLOOD PRESSURE: 103 MMHG | SYSTOLIC BLOOD PRESSURE: 154 MMHG | TEMPERATURE: 97 F

## 2018-11-30 LAB
BUN BLD-MCNC: 18 MG/DL (ref 7–18)
GLUCOSE SERPLBLD-MCNC: 131 MG/DL (ref 74–106)
HCT VFR BLD CALC: 36.2 % (ref 39.6–49)
LYMPHOCYTES # SPEC AUTO: 2 K/UL (ref 0.7–4.9)
MCH RBC QN AUTO: 34.8 PG (ref 27–35)
MCV RBC: 98.2 FL (ref 80–100)
PMV BLD: 8 FL (ref 7.6–11.3)
POTASSIUM SERPL-SCNC: 4 MMOL/L (ref 3.5–5.1)
RBC # BLD: 3.69 M/UL (ref 4.33–5.43)
UA COMPLETE W REFLEX CULTURE PNL UR: (no result)
UA DIPSTICK W REFLEX MICRO PNL UR: (no result)

## 2018-11-30 RX ADMIN — ONDANSETRON PRN MG: 2 INJECTION INTRAMUSCULAR; INTRAVENOUS at 02:15

## 2018-11-30 RX ADMIN — SODIUM CHLORIDE SCH MLS: 0.9 INJECTION, SOLUTION INTRAVENOUS at 08:47

## 2018-11-30 RX ADMIN — ONDANSETRON PRN MG: 2 INJECTION INTRAMUSCULAR; INTRAVENOUS at 08:33

## 2018-11-30 RX ADMIN — HEPARIN SODIUM SCH: 5000 INJECTION, SOLUTION INTRAVENOUS; SUBCUTANEOUS at 08:44

## 2018-11-30 RX ADMIN — SODIUM CHLORIDE SCH MLS: 0.9 INJECTION, SOLUTION INTRAVENOUS at 00:21

## 2018-11-30 RX ADMIN — Medication SCH ML: at 08:44

## 2018-11-30 RX ADMIN — MORPHINE SULFATE PRN MG: 2 INJECTION, SOLUTION INTRAMUSCULAR; INTRAVENOUS at 02:14

## 2018-11-30 RX ADMIN — METRONIDAZOLE SCH MLS: 500 INJECTION, SOLUTION INTRAVENOUS at 08:39

## 2018-11-30 RX ADMIN — METRONIDAZOLE SCH MLS: 500 INJECTION, SOLUTION INTRAVENOUS at 00:22

## 2018-11-30 RX ADMIN — SODIUM CHLORIDE SCH MG: 0.9 INJECTION, SOLUTION INTRAVENOUS at 08:41

## 2018-12-01 ENCOUNTER — HOSPITAL ENCOUNTER (OUTPATIENT)
Dept: HOSPITAL 97 - ER | Age: 28
Setting detail: OBSERVATION
LOS: 2 days | Discharge: HOME | End: 2018-12-03
Attending: HOSPITALIST | Admitting: HOSPITALIST
Payer: SELF-PAY

## 2018-12-01 VITALS — BODY MASS INDEX: 17.2 KG/M2

## 2018-12-01 DIAGNOSIS — Z86.73: ICD-10-CM

## 2018-12-01 DIAGNOSIS — F14.10: ICD-10-CM

## 2018-12-01 DIAGNOSIS — F12.10: ICD-10-CM

## 2018-12-01 DIAGNOSIS — R10.84: Primary | ICD-10-CM

## 2018-12-01 DIAGNOSIS — G43.A1: ICD-10-CM

## 2018-12-01 DIAGNOSIS — D72.828: ICD-10-CM

## 2018-12-01 LAB
ALBUMIN SERPL BCP-MCNC: 4.6 G/DL (ref 3.4–5)
ALP SERPL-CCNC: 45 U/L (ref 45–117)
ALT SERPL W P-5'-P-CCNC: 28 U/L (ref 12–78)
AST SERPL W P-5'-P-CCNC: 22 U/L (ref 15–37)
BUN BLD-MCNC: 13 MG/DL (ref 7–18)
GLUCOSE SERPLBLD-MCNC: 118 MG/DL (ref 74–106)
HCT VFR BLD CALC: 39.1 % (ref 39.6–49)
LIPASE SERPL-CCNC: 103 U/L (ref 73–393)
LYMPHOCYTES # SPEC AUTO: 1.8 K/UL (ref 0.7–4.9)
MCH RBC QN AUTO: 34.5 PG (ref 27–35)
MCV RBC: 98.3 FL (ref 80–100)
PMV BLD: 8.2 FL (ref 7.6–11.3)
POTASSIUM SERPL-SCNC: 4.1 MMOL/L (ref 3.5–5.1)
RBC # BLD: 3.98 M/UL (ref 4.33–5.43)

## 2018-12-01 PROCEDURE — 80307 DRUG TEST PRSMV CHEM ANLYZR: CPT

## 2018-12-01 PROCEDURE — 87086 URINE CULTURE/COLONY COUNT: CPT

## 2018-12-01 PROCEDURE — 80076 HEPATIC FUNCTION PANEL: CPT

## 2018-12-01 PROCEDURE — 83690 ASSAY OF LIPASE: CPT

## 2018-12-01 PROCEDURE — 81003 URINALYSIS AUTO W/O SCOPE: CPT

## 2018-12-01 PROCEDURE — 36415 COLL VENOUS BLD VENIPUNCTURE: CPT

## 2018-12-01 PROCEDURE — 87088 URINE BACTERIA CULTURE: CPT

## 2018-12-01 PROCEDURE — 76377 3D RENDER W/INTRP POSTPROCES: CPT

## 2018-12-01 PROCEDURE — 74176 CT ABD & PELVIS W/O CONTRAST: CPT

## 2018-12-01 PROCEDURE — 96375 TX/PRO/DX INJ NEW DRUG ADDON: CPT

## 2018-12-01 PROCEDURE — 80053 COMPREHEN METABOLIC PANEL: CPT

## 2018-12-01 PROCEDURE — 80048 BASIC METABOLIC PNL TOTAL CA: CPT

## 2018-12-01 PROCEDURE — 85025 COMPLETE CBC W/AUTO DIFF WBC: CPT

## 2018-12-01 PROCEDURE — 96374 THER/PROPH/DIAG INJ IV PUSH: CPT

## 2018-12-01 PROCEDURE — 96361 HYDRATE IV INFUSION ADD-ON: CPT

## 2018-12-01 PROCEDURE — 99285 EMERGENCY DEPT VISIT HI MDM: CPT

## 2018-12-01 NOTE — DS
Date of Discharge:  11/30/2018



Consultants:  Dr. Prabhakar with Nephrology.



Admitting Diagnoses:  

1.Acute colitis.

2.Acute kidney injury.

3.Epigastric abdominal pain.

4.Intractable nausea and vomiting.



Discharge Diagnoses:  

1.Acute colitis, resolved.

2.Acute kidney injury, likely prerenal azotemia, resolved.  Creatinine normalized.

3.Acute generalized abdominal pain, resolved.

4.Intractable nausea and vomiting, likely cyclical vomiting due to cannabinoid abuse.

5.Hyperglycemia.

6.Cocaine abuse.

7.Cannabinoid abuse, counseled.



Hospital Course:  The patient is a 28-year-old male who came into the hospital with acute abdominal p
ain.  CT scan of the abdomen was done, did not show a nonobstructing renal calculi; however, he did h
ave an elevated white count of 24,000 with bandemia.  The patient's kidney function was 3.5, which wa
s acute from his baseline.  The patient did have elevated lactate level.  He was given IV fluid hydra
tion.  Lactate level came down to 1.9.  His kidney function also improved.  The patient was seen by n
ephrologist.  UDS was done, which was positive for cocaine, THC, and opiates.  The patient did receiv
e narcotic pain medications, however the patient did accept that he used marijuana.  He vehemently de
nied cocaine use; however, stated that it is possible his marijuana may have been tainted.  His abdom
inal pain improved.  He was then started on clear liquids and his diet was advanced.  The patient was
 able to tolerate solid food.  Likely his nausea, vomiting, and abdominal pain were caused by colitis
 and some cyclical vomiting syndrome secondary to marijuana abuse.  The patient was counseled alix ramirez.  He stated that he plans to quit cannabinoid use.  The patient was then cleared for discharge f
rom consultants' standpoint.  His symptoms had resolved.  He was tolerating his diet.  He did not hav
e any further nausea or vomiting.  He was then discharged home in stable condition.



Activity:  As tolerated.



Medications:  As per medication reconciliation list.



Followup:  Establish care with primary care physician in 2 days.  Return to ER for worsening conditio
n.



Diet:  Regular.



Physical Examination:

General:  Awake, alert, and oriented, in no acute distress. 

CV:  S1, S2.  No murmurs. 

Respiratory:  Moving air well bilaterally. 

Abdomen:  Soft, nontender, nondistended.  Positive bowel sounds. 

Extremities:  No clubbing, cyanosis, edema. 

Neurologic:  Nonfocal. 



Total time spent discharging the patient was 31 minutes.





/KIMMY

DD:  11/30/2018 15:18:38Voice ID:  [id number]

DT:  12/01/2018 05:43:46Report ID:  [id number]

## 2018-12-01 NOTE — RAD REPORT
EXAM DESCRIPTION:  CT - Stone Protocol - 12/1/2018 8:26 pm

 

CLINICAL HISTORY:  Flank pain.

lower abdominal pain

 

COMPARISON:  Abdomen   Pelvis Wo Contrast dated 11/28/2018

 

TECHNIQUE:  Axial images were obtained without oral or IV contrast. Lack of contrast limits solid org
an and vascular assessment. The field-of-view spans the entirety of the  system partially obscuring
 uppermost abdomen and lung bases. Coronal reformatted images were obtained and reviewed.

 

All CT scans are performed using dose optimization technique as appropriate and may include automated
 exposure control or mA/KV adjustment according to patient size.

 

FINDINGS:  The lower lung fields are clear.

 

Imaged portions of the liver and spleen show no suspicious findings on non-contrast imaging. The panc
reas and adrenal glands are normal. No pathologic lymphadenopathy in the abdomen or pelvis.

 

Small nonobstructing bilateral caliceal are present.

 

No bowel obstruction, free air, free fluid or abscess. The appendix is not identified as a discrete s
tructure, however, no secondary findings of appendicitis are identified.

 

No significant bony abnormality.

 

IMPRESSION:  Small bilateral caliceal stones without hydronephrosis.

## 2018-12-02 VITALS — OXYGEN SATURATION: 100 %

## 2018-12-02 LAB
METHAMPHET UR QL SCN: NEGATIVE
THC SERPL-MCNC: POSITIVE NG/ML
UA DIPSTICK W REFLEX MICRO PNL UR: (no result)

## 2018-12-02 RX ADMIN — MORPHINE SULFATE PRN MG: 2 INJECTION, SOLUTION INTRAMUSCULAR; INTRAVENOUS at 23:18

## 2018-12-02 RX ADMIN — MORPHINE SULFATE PRN MG: 2 INJECTION, SOLUTION INTRAMUSCULAR; INTRAVENOUS at 17:06

## 2018-12-02 RX ADMIN — Medication SCH ML: at 21:16

## 2018-12-02 RX ADMIN — SODIUM CHLORIDE SCH MG: 0.9 INJECTION, SOLUTION INTRAVENOUS at 21:16

## 2018-12-02 RX ADMIN — SODIUM CHLORIDE SCH MLS: 0.9 INJECTION, SOLUTION INTRAVENOUS at 02:13

## 2018-12-02 RX ADMIN — SODIUM CHLORIDE SCH MLS: 0.9 INJECTION, SOLUTION INTRAVENOUS at 12:00

## 2018-12-02 RX ADMIN — SODIUM CHLORIDE SCH MLS: 0.9 INJECTION, SOLUTION INTRAVENOUS at 21:16

## 2018-12-02 RX ADMIN — Medication SCH: at 08:58

## 2018-12-02 NOTE — P.HP
Certification for Inpatient


Patient admitted to: Observation


With expected LOS: <2 Midnights


Patient will require the following post-hospital care: None


Practitioner: I am a practitioner with admitting privileges, knowledge of 

patient current condition, hospital course, and medical plan of care.


Services: Services provided to patient in accordance with Admission 

requirements found in Title 42 Section 412.3 of the Code of Federal Regulations





Patient History


Date of Service: 12/02/18


Reason for admission: Intractable abdominal pain


History of Present Illness: 





Patient is a 28-year-old gentleman who came into the hospital with abdominal 

discomfort.  Pain was mainly in the lower abdomen and it was generalized 

diffusely. patient has been having quite a bit of pain for the last week.  

Patient has lost quite a bit of weight as well.  On examination his clothes not 

fit and was so ever.  He states he is not able to keep anything down.  He does 

have a history of polysubstance abuse.  Patient uses cannabis.  Patient's drug 

screen revealed cocaine, benzodiazepine, and marijuana.  patient has had CT 

scans which are unremarkable.  Concern for other etiology of abdominal pain or 

withdrawals.  Patient may need to be worked up a little bit more thoroughly.


Allergies





No Known Allergies Allergy (Verified 12/02/18 02:02)


 





Home Medications: 








NK [No Home Meds]  11/29/18 








- Past Medical/Surgical History


Has patient received pneumonia vaccine in the past: No


Diabetic: No


-: wrist fracture


-: TIA with aphasia (7/15)


-: anxiety


-: back pain


-: kidney stones


-: R wrist fracture


-: appendectomy





- Family History


  ** Mother


Medical History: Cancer


Notes: lung cx





- Social History


Smoking Status: Current every day smoker


Alcohol use: Yes


CD- Drugs: Yes


Caffeine use: Yes


Place of Residence: Home





Review of Systems


10-point ROS is otherwise unremarkable





Physical Examination





- Vital Signs


Temperature: 98.6 F


Blood Pressure: 105/61


Pulse: 54


Respirations: 20


Pulse Ox (%): 99





- Physical Exam


General: Alert, In no apparent distress, Oriented x3


HEENT: Atraumatic, PERRLA, Mucous membr. moist/pink, EOMI, Sclerae nonicteric


Neck: Supple, 2+ carotid pulse no bruit, No LAD, Without JVD or thyroid 

abnormality


Respiratory: Clear to auscultation bilaterally, Normal air movement


Cardiovascular: Regular rate/rhythm, Normal S1 S2, No murmurs


Gastrointestinal: Normal bowel sounds, Soft and benign, Non-distended, No 

rebound, Tenderness, Guarding


Musculoskeletal: No clubbing, No swelling, No tenderness


Integumentary: No rashes


Neurological: Normal gait, Normal speech, Normal strength at 5/5 x4 extr, 

Normal tone, Normal affect


Lymphatics: No axilla or inguinal lymphadenopathy





- Studies


Laboratory Data (last 24 hrs)





12/01/18 19:55: Creatinine 1.00  D


12/01/18 19:55: WBC 11.1 H, Hgb 13.7, Hct 39.1 L, Plt Count 341


12/01/18 19:55: Sodium 140, Potassium 4.1, BUN 13, Creatinine 1.00, Glucose 118 

H, Total Bilirubin 0.6, AST 22, ALT 28, Alkaline Phosphatase 45, Lipase 103








Assessment & Plan





- Problems (Diagnosis)


(1) Abdominal pain


Onset Date: 10/19/15   Current Visit: No   Status: Acute   


Qualifiers: 


 





(2) Nausea & vomiting


Onset Date: 10/19/15   Current Visit: No   Status: Acute   


Qualifiers: 


 





- Plan





Plan:


1. IV hydration


2. Pain control


3. Continue with antiemetics


4. Monitor labs closely


5. GI/DVT prophylaxis


Discharge Plan: Home


Plan to discharge in: 48 Hours





- Advance Directives


Does patient have a Living Will: No


Does patient have a Durable POA for Healthcare: No





- Code Status/Comfort Care


Code Status Assessed: Yes


Code Status: Full Code


Critical Care: No


Time Spent Managing PTS Care (In Minutes): 50

## 2018-12-02 NOTE — PN
Date of Progress Note:  12/02/2018



Subjective:  The patient was seen and examined.  Chart reviewed and case discussed with RN.  The nic
ent still reports abdominal pain.  He denies marijuana use; however, is positive again on the UDS.  T
he patient was recently discharged on 11/30/2018 for similar symptoms.  No nausea or vomiting.



Medication List:  Reviewed.



Physical Examination:

Vital Signs:  Temperature 97.8, heart rate 99, blood pressure 125/72, respirations 18, O2 of 99% on r
oom air. 

General:  Awake, alert, oriented x3.  Ill-appearing male, cachectic, BMI 17, in mild amount of pain a
nd distress. 

CV:  S1, S2.  Regular rate and rhythm.  Peripheral pulses present. 

Respiratory:  Moving air well bilaterally.  No wheezing or stridor.  No use of accessory muscles. 

Gastrointestinal:  Abdomen is soft.  Pain out of proportion to exam.  No rebound or guarding.  Positi
ve bowel sounds. 

Extremities:  No clubbing, cyanosis, or edema.  No calf tenderness. 

Neuro:  Cranial nerves 2 through 12 intact grossly.  No focal neurological deficit.  Speech is normal
.  Strength is 5/5 in bilateral upper and lower extremities.  Sensation intact to light touch. 

Skin:  No rashes.  Normal skin turgor. 

Psych:  Mood is anxious.  Affect is congruent with mood.  Insight and judgment are poor.



Laboratory Data:  WBC 11.1, H and H 13.7/39.1, platelets 341.  UDS positive for benzodiazepines and T
HC.  CT scan of the abdomen and pelvis personally reviewed, shows small bilateral calyceal stones wit
hout hydronephrosis.



Assessment:  A 28-year-old male with:

1.Generalized abdominal pain, unclear etiology.  The patient may possibly have ischemic disease.  Th
e patient already had a CT abdomen.  We will obtain CT abdomen with angiogram in a.m.

2.Intractable nausea, vomiting, cyclical, likely related to marijuana abuse.  The patient has been c
ounseled extensively.

3.Polysubstance abuse with marijuana, last time was positive for cocaine and benzodiazepines are pos
itive at this time.

4.Neutrophilic leukocytosis, likely secondary to above.



Plan:  GI and DVT prophylaxis.  PPI and SCDs.  Continue with n.p.o. status, surgical consultation, an
giogram in a.m.  IV pain medications will be adjusted.  Continue IV fluids.  Likely discharge in the 
next 24 hours depending on clinical response.





SA/MODL

DD:  12/02/2018 14:27:10Voice ID:  [id number]

DT:  12/02/2018 19:30:31Report ID:  [id number]

## 2018-12-02 NOTE — EDPHYS
Physician Documentation                                                                           

 [facility]                                                                

Name: Zhang Quijano                                                                                

Age: 28 yrs                                                                                       

Sex: Male                                                                                         

: 1990                                                                                   

MRN: [id number]                                                                                   

Arrival Date: 2018                                                                          

Time: 19:31                                                                                       

Account#: [id number]                                                                            

Bed 26                                                                                            

Private MD:                                                                                       

ED Physician Buck Silver                                                                         

HPI:                                                                                              

                                                                                             

19:44 This 28 yrs old  Male presents to ER via Ambulatory with complaints of         jmm 

      Abdominal Pain.                                                                             

19:44 The patient presents with abdominal pain in the lower abdomen. Onset: The               jmm 

      symptoms/episode began/occurred acutely, this morning. The symptoms do not radiate.         

      Associated signs and symptoms: Pertinent positives: nausea and vomiting. The symptoms       

      are described as achy, sharp. This is a 28 year old male with a history of anxiety that     

      presents to the ED with lower abdominal pain and vomiting returning this morning.           

      Patient was recently discharged from the hospital due to colitis. Patient denies drug       

      or alcohol use. .                                                                           

                                                                                                  

Historical:                                                                                       

- Allergies:                                                                                      

20:05 Neurontin;                                                                              mg2 

- Home Meds:                                                                                      

20:05 None [Active];                                                                          mg2 

- PMHx:                                                                                           

20:05 Anxiety; Kidney stones; Back pain;                                                      mg2 

- PSHx:                                                                                           

20:05 None;                                                                                   mg2 

                                                                                                  

- Immunization history:: Flu vaccine status is unknown.                                           

- Social history:: Smoking status: unknown.                                                       

- Ebola Screening: : No symptoms or risks identified at this time.                                

                                                                                                  

                                                                                                  

ROS:                                                                                              

19:44 Constitutional: Negative for fever, chills, and weight loss, Cardiovascular: Negative   jmm 

      for chest pain, palpitations, and edema, Respiratory: Negative for shortness of breath,     

      cough, wheezing, and pleuritic chest pain.                                                  

19:44 Abdomen/GI: Positive for abdominal pain, nausea and vomiting.                               

                                                                                                  

Exam:                                                                                             

19:44 Head/Face:  atraumatic. Chest/axilla:  Normal chest wall appearance and motion.         jmm 

      Cardiovascular:  Regular rate and rhythm.  No edema appreciated Respiratory:  Normal        

      respirations, no respiratory distress appreciated                                           

19:44 Skin:  General appearance color normal MS/ Extremity:  Moves all extremities, no            

      obvious deformities appreciated, no edema noted to the lower extremities  Neuro:  Awake     

      and alert, normal gait Psych:  Behavior is normal, Mood is normal, Patient is               

      cooperative and pleasant                                                                    

19:44 Constitutional: The patient appears alert, awake, uncomfortable.                            

19:44 Abdomen/GI: Inspection: abdomen appears normal, Bowel sounds: normal, Palpation: soft,      

      mild abdominal tenderness, in the right lower quadrant and left lower quadrant.             

                                                                                                  

Vital Signs:                                                                                      

19:31  / 128; Pulse 107; Resp 20; Temp 99.6(TE); Pulse Ox 99% on R/A; Weight 63.5 kg    ak1 

      (R); Height 5 ft. 11 in. (180.34 cm) (R); Pain 10/10;                                       

21:14  / 96; Pulse 65; Resp 18; Pulse Ox 100% on R/A; Pain 7/10;                        mg2 

                                                                                             

00:16  / 91; Pulse 64; Resp 18; Pulse Ox 100% on R/A; Pain 3/10;                        jl3 

                                                                                             

19:31 Body Mass Index 19.53 (63.50 kg, 180.34 cm)                                             ak1 

                                                                                                  

MDM:                                                                                              

                                                                                             

19:44 Patient medically screened.                                                             [facility] 

                                                                                             

00:25 Data reviewed: vital signs, nurses notes. Counseling: I had a detailed discussion with  [facility] 

      the patient and/or guardian regarding: the historical points, exam findings, and any        

      diagnostic results supporting the discharge/admit diagnosis, lab results, radiology         

      results, the need for further work-up and treatment in the hospital. ED course: Patient     

      continues to vomiting after administration of antiemetics. I discussed the patient with     

      Dr. Gan whom accepted admission. .                                                         

                                                                                                  

                                                                                             

19:38 Order name: Basic Metabolic Panel                                                       [facility] 

                                                                                             

19:38 Order name: CBC with Diff                                                               [facility] 

                                                                                             

19:38 Order name: Creatinine for Radiology                                                    [facility] 

                                                                                             

19:38 Order name: Hepatic Function                                                            [facility] 

                                                                                             

19:38 Order name: Lipase                                                                      [facility] 

                                                                                             

20:19 Order name: CBC with Automated Diff; Complete Time: 20:19                               [facility]

                                                                                             

19:45 Order name: CT Stone Protocol                                                           [facility] 

                                                                                             

20:41 Order name: CT; Complete Time: 20:47                                                    [facility]

                                                                                             

20:55 Order name: Creatinine (Radiology Only); Complete Time: 20:59                           [facility]

                                                                                             

21:05 Order name: Basic Metabolic Panel; Complete Time: 21:09                                 [facility]

                                                                                             

21:05 Order name: Liver (Hepatic) Function; Complete Time: 21:09                              [facility]

                                                                                             

21:05 Order name: Lipase; Complete Time: 21:09                                                [facility]

                                                                                             

21:14 Order name: Urine Dipstick--Ancillary (enter results)                                   [facility] 

                                                                                             

21:52 Order name: Urine Dipstick-Ancillary; Complete Time: 21:53                              [facility]

                                                                                             

19:38 Order name: IV Saline Lock; Complete Time: 20:02                                        [facility] 

                                                                                             

19:38 Order name: Labs collected and sent; Complete Time: 20:02                               [facility] 

                                                                                             

21:14 Order name: Urine Dipstick-Ancillary (obtain specimen); Complete Time: 21:16            [facility] 

                                                                                             

22:34 Order name: PO challenge; Complete Time: 23:31                                          [facility] 

                                                                                                  

Administered Medications:                                                                         

                                                                                             

20:01 Drug: Zofran 4 mg Route: IVP; Site: right forearm;                                      mg2 

20:01 Drug: Lactated Ringers Solution 1000 ml Route: IV; Rate: 1000 bolus; Site: right        mg2 

      forearm;                                                                                    

20:02 Drug: morphine 4 mg Route: IVP; Site: right forearm;                                    mg2 

21:12 Drug: Valium 5 mg Route: IVP; Site: right forearm;                                      mg2 

22:29 Follow up: Response: No adverse reaction; Marked relief of symptoms                     mg2 

22:28 Drug: Reglan 10 mg Route: IVP; Site: right forearm;                                     mg2 

23:31 Follow up: Response: No adverse reaction; Marked relief of symptoms                     mg2 

22:28 Drug: NS 0.9% 500 ml Route: IV; Rate: bolus; Site: right forearm;                       mg2 

23:31 Follow up: Response: No adverse reaction; IV Status: Completed infusion                 mg2 

22:28 Drug: diphenhydrAMINE 12.5 mg Route: IVP; Site: right forearm;                          mg2 

23:31 Follow up: Response: No adverse reaction; Marked relief of symptoms                     mg2 

                                                                                             

01:29 Drug: Zofran 4 mg Route: IVP; Site: right forearm;                                      jl3 

01:45 Follow up: Response: No adverse reaction; Marked relief of symptoms                     mg2 

                                                                                                  

                                                                                                  

Disposition:                                                                                      

05:32 Co-signature as Attending Physician, Buck Silver MD.                                    rn  

                                                                                                  

Disposition:                                                                                      

18 00:26 Hospitalization ordered by Iris Gan for Observation. Preliminary             

  diagnosis is Intractable Vomiting.                                                              

- Bed requested for Telemetry/MedSurg (observation).                                              

- Status is Observation.                                                                      mg2 

- Condition is Stable.                                                                            

- Problem is an acute exacerbation.                                                               

- Symptoms are unchanged.                                                                         

UTI on Admission? No                                                                              

                                                                                                  

                                                                                                  

                                                                                                  

Signatures:                                                                                       

Dispatcher [employer]                           Maisha Denton, RN                     RN   kl                                                   

Spenser Gonzáles PA PA   [facility]                                                  

Buck Silver MD MD rn Martinez, Eric                               em1                                                  

Emmanuel Andres RN                        RN   jl3                                                  

Carlos Bess RN                    RN   mg2                                                  

                                                                                                  

Corrections: (The following items were deleted from the chart)                                    

01:04 00:26 Hospitalization Ordered by Iris Gan MD for Observation. Preliminary          kl  

      diagnosis is Intractable Vomiting. Bed requested for Telemetry/MedSurg (observation).       

      Status is Observation. Condition is Stable. Problem is an acute exacerbation. Symptoms      

      are unchanged. UTI on Admission? No. jm                                                    

01:55 01:04 2018 00:26 Hospitalization Ordered by Iris Gan MD for Observation.     mg2 

      Preliminary diagnosis is Intractable Vomiting. Bed requested for Telemetry/MedSurg          

      (observation). Status is Observation. Condition is Stable. Problem is an acute              

      exacerbation. Symptoms are unchanged. UTI on Admission? No. kl                              

                                                                                                  

**************************************************************************************************

## 2018-12-02 NOTE — ER
Nurse's Notes                                                                                     

 [facility]                                                                

Name: Zhang Quijano                                                                                

Age: 28 yrs                                                                                       

Sex: Male                                                                                         

: 1990                                                                                   

MRN: [id number]                                                                                   

Arrival Date: 2018                                                                          

Time: 19:31                                                                                       

Account#: [id number]                                                                            

Bed 26                                                                                            

Private MD:                                                                                       

Diagnosis: Intractable Vomiting                                                                   

                                                                                                  

Presentation:                                                                                     

                                                                                             

19:31 Presenting complaint: Patient states: abd pain with vomiting. pt discharged from        [address] today. Transition of care: patient was not received from another setting of        

      care. Onset of symptoms is unknown. Care prior to arrival: None.                            

19:31 Method Of Arrival: Ambulatory                                                           [facility] 

19:31 Acuity: SUZETTE 3                                                                           [facility] 

20:05 Risk Assessment: Do you want to hurt yourself or someone else? Patient reports no       mg2 

      desire to harm self or others. Initial Sepsis Screen: Does the patient meet any 2           

      criteria? No. Patient's initial sepsis screen is negative. Does the patient have a          

      suspected source of infection? No. Patient's initial sepsis screen is negative.             

                                                                                                  

Historical:                                                                                       

- Allergies:                                                                                      

20:05 Neurontin;                                                                              mg2 

- Home Meds:                                                                                      

20:05 None [Active];                                                                          mg2 

- PMHx:                                                                                           

20:05 Anxiety; Kidney stones; Back pain;                                                      mg2 

- PSHx:                                                                                           

20:05 None;                                                                                   mg2 

                                                                                                  

- Immunization history:: Flu vaccine status is unknown.                                           

- Social history:: Smoking status: unknown.                                                       

- Ebola Screening: : No symptoms or risks identified at this time.                                

                                                                                                  

                                                                                                  

Screenin:04 Abuse screen: Denies threats or abuse. Denies injuries from another. Nutritional        mg2 

      screening: No deficits noted. Tuberculosis screening: No symptoms or risk factors           

      identified. Fall Risk IV access (20 points).                                                

                                                                                                  

Assessment:                                                                                       

20:02 General: Appears uncomfortable, Behavior is restless. Pain: Complains of pain in        mg2 

      abdomen Pain does not radiate. Pain currently is 10 out of 10 on a pain scale. Quality      

      of pain is described as aching, Pain began gradually, since 2 pm today. Neuro: Level of     

      Consciousness is awake, alert, obeys commands, Oriented to person, place, time,             

      situation. Cardiovascular: Capillary refill < 3 seconds Patient's skin is warm and dry.     

      Respiratory: Airway is patent Respiratory effort is even, unlabored, Respiratory            

      pattern is regular, symmetrical. GI: Bowel sounds present X 4 quads. Abd is soft and        

      non tender. GI: Reports lower abdominal pain, upper abdominal pain, vomiting. : No        

      signs and/or symptoms were reported regarding the genitourinary system. EENT: No signs      

      and/or symptoms were reported regarding the EENT system. Derm: Skin is intact, is           

      healthy with good turgor, Skin is pink, warm \T\ dry. normal. Musculoskeletal: No signs     

      and/or symptoms reported regarding the musculoskeletal system.                              

                                                                                             

01:16 Reassessment: Patient appears in no apparent distress at this time. Patient and/or      jl3 

      family updated on plan of care and expected duration. Pain level reassessed. Patient is     

      alert, oriented x 3, equal unlabored respirations, skin warm/dry/pink. advised for          

      admission. patient agreed. LEANDER Gonzalez received the report.                                 

                                                                                                  

Vital Signs:                                                                                      

                                                                                             

19:31  / 128; Pulse 107; Resp 20; Temp 99.6(TE); Pulse Ox 99% on R/A; Weight 63.5 kg    ak1 

      (R); Height 5 ft. 11 in. (180.34 cm) (R); Pain 10/10;                                       

21:14  / 96; Pulse 65; Resp 18; Pulse Ox 100% on R/A; Pain 7/10;                        mg2 

                                                                                             

00:16  / 91; Pulse 64; Resp 18; Pulse Ox 100% on R/A; Pain 3/10;                        jl3 

                                                                                             

19:31 Body Mass Index 19.53 (63.50 kg, 180.34 cm)                                             ak1 

                                                                                                  

ED Course:                                                                                        

                                                                                             

19:31 Patient arrived in ED.                                                                  ak1 

19:31 Arm band placed on Patient placed in an exam room, on a stretcher, Patient notified of  ak1 

      wait time.                                                                                  

19:32 Triage completed.                                                                       ak1 

19:37 Spenser Gonzáles PA is PHCP.                                                              jmm 

19:37 Buck Silver MD is Attending Physician.                                                jmm 

19:54 Carlos Bess, RN is Primary Nurse.                                                  mg2 

20:02 No provider procedures requiring assistance completed. Inserted saline lock: 20 gauge   mg2 

      in right forearm, using aseptic technique. Blood collected. by SALVADOR Andrade.                 

20:05 Patient has correct armband on for positive identification.                             mg2 

20:26 CT completed. Patient moved to CT via wheelchair. Patient moved back from CT.           kw1 

                                                                                             

00:26 Iris Gan MD is Hospitalizing Provider.                                           [facility] 

01:45 Patient admitted, IV remains in place.                                                  mg2 

                                                                                                  

Administered Medications:                                                                         

                                                                                             

20:01 Drug: Zofran 4 mg Route: IVP; Site: right forearm;                                      mg2 

20:01 Drug: Lactated Ringers Solution 1000 ml Route: IV; Rate: 1000 bolus; Site: right        mg2 

      forearm;                                                                                    

20:02 Drug: morphine 4 mg Route: IVP; Site: right forearm;                                    mg2 

21:12 Drug: Valium 5 mg Route: IVP; Site: right forearm;                                      mg2 

22:29 Follow up: Response: No adverse reaction; Marked relief of symptoms                     mg2 

22:28 Drug: Reglan 10 mg Route: IVP; Site: right forearm;                                     mg2 

23:31 Follow up: Response: No adverse reaction; Marked relief of symptoms                     mg2 

22:28 Drug: NS 0.9% 500 ml Route: IV; Rate: bolus; Site: right forearm;                       mg2 

23:31 Follow up: Response: No adverse reaction; IV Status: Completed infusion                 mg2 

22:28 Drug: diphenhydrAMINE 12.5 mg Route: IVP; Site: right forearm;                          mg2 

23:31 Follow up: Response: No adverse reaction; Marked relief of symptoms                     mg2 

                                                                                             

01:29 Drug: Zofran 4 mg Route: IVP; Site: right forearm;                                      jl3 

01:45 Follow up: Response: No adverse reaction; Marked relief of symptoms                     mg2 

                                                                                                  

                                                                                                  

Outcome:                                                                                          

00:26 Decision to Hospitalize by Provider.                                                    m 

01:46 Admitted to Med/surg accompanied by tech, via wheelchair, room 211, with chart, Report  mg2 

      called to  LEANDER Gonzalez                                                                      

01:46 Condition: stable                                                                           

01:46 Instructed on the need for admit, Demonstrated understanding of instructions.               

01:55 Patient left the ED.                                                                    mg2 

                                                                                                  

Signatures:                                                                                       

Spenser Gonzáles PA PA jmm Lowman, John RN                        RN   jl3                                                  

Deya Conner RN                       RN   ak1                                                  

Maisha Camacho1                                                  

Carlos Bess RN                    RN   mg2                                                  

                                                                                                  

**************************************************************************************************

## 2018-12-03 VITALS — DIASTOLIC BLOOD PRESSURE: 72 MMHG | TEMPERATURE: 98 F | SYSTOLIC BLOOD PRESSURE: 119 MMHG

## 2018-12-03 LAB
ALBUMIN SERPL BCP-MCNC: 3.8 G/DL (ref 3.4–5)
ALP SERPL-CCNC: 38 U/L (ref 45–117)
ALT SERPL W P-5'-P-CCNC: 23 U/L (ref 12–78)
AST SERPL W P-5'-P-CCNC: 13 U/L (ref 15–37)
BUN BLD-MCNC: 10 MG/DL (ref 7–18)
GLUCOSE SERPLBLD-MCNC: 124 MG/DL (ref 74–106)
HCT VFR BLD CALC: 36.4 % (ref 39.6–49)
LYMPHOCYTES # SPEC AUTO: 3.4 K/UL (ref 0.7–4.9)
MCH RBC QN AUTO: 34.8 PG (ref 27–35)
MCV RBC: 98.9 FL (ref 80–100)
PMV BLD: 8.5 FL (ref 7.6–11.3)
POTASSIUM SERPL-SCNC: 3.6 MMOL/L (ref 3.5–5.1)
RBC # BLD: 3.68 M/UL (ref 4.33–5.43)

## 2018-12-03 RX ADMIN — MORPHINE SULFATE PRN MG: 2 INJECTION, SOLUTION INTRAMUSCULAR; INTRAVENOUS at 09:25

## 2018-12-03 RX ADMIN — Medication SCH ML: at 09:25

## 2018-12-03 RX ADMIN — SODIUM CHLORIDE SCH MG: 0.9 INJECTION, SOLUTION INTRAVENOUS at 09:25

## 2018-12-03 RX ADMIN — MORPHINE SULFATE PRN MG: 2 INJECTION, SOLUTION INTRAMUSCULAR; INTRAVENOUS at 05:39

## 2018-12-03 RX ADMIN — SODIUM CHLORIDE SCH: 0.9 INJECTION, SOLUTION INTRAVENOUS at 08:00

## 2018-12-03 NOTE — CON
Date of Consultation:  12/03/2018



Reason For Consultation:  Abdominal pain.



Brief History Of Present Illness:  The patient is a 28-year-old  gentleman, who has a histor
y of abdominal pain beginning approximately he states 4 to 5 years ago.  The abdominal pain is descri
bed as difficulty with belching and sometimes following these inability to belch episodes he will hav
e multiple episodes of vomiting which can last for several minutes, but it is vigorous aggressive vom
iting by his description, and he will have multiple episodes back-to-back of vomiting and he gets deh
ydrated on these occasions.  He states that over the past 4 to 5 months it has significantly worse ha
ving exacerbations approximately once a month occasionally more often.  He has been in the hospital s
everal times in the past week or two with similar complaints.  Workup has not revealed any significan
t pathology to explain this with the exception of vigorous vomiting associated with his marijuana usa
ge as well as benzodiazepines.  The pain is globally over his abdomen, but somewhat worse in the lowe
r quadrants.  He states he has lost about 20 pounds in the past year as well and has difficulty with 
maintaining nutrition.  He does have a history of polysubstance abuse.  He currently uses cannabis.  
The drug screen, however, revealed cocaine, benzodiazepines, and marijuana.  He had an EGD in the pas
t, which showed that he had a polyp of his colon, which was done approximately 4 to 5 years ago, EGD 
and colonoscopy, and he states he believes he may have had an ulcer at that time, but he is uncertain
 of the exact findings of the EGD and colonoscopy and cannot recall the specifics of it other than th
e above stated findings.



Past Medical History:  Significant for wrist fracture, TIA with aphagia on 07/15, anxiety, back pain,
 kidney stones, right wrist fracture specifically.



Past Surgical History:  Includes appendectomy.



Allergies:  NO KNOWN DRUG ALLERGIES.



Medications:  None.



Social History:  He smokes every day.  He uses alcohol recreationally and recreational drug use inclu
thad marijuana and as stated above he has a positive cocaine screen.



Review of Systems:

A 10-point review of systems other than HPI, denies on review of systems.



Physical Examination:

General:  At the time of examination, his BMI is 17.3.  He is awake, alert, and oriented. 

Vital Signs:  Blood pressure of 121/71, pulse 64, respiratory rate 16, temperature 97.6. 

Psychiatric:  He is appropriately conversive. 

HEENT:  Normocephalic.  Sclerae icteric.  Mucous members moist.  Oropharynx clear. 

Neck:  Supple.  No JVD. 

Chest:  Normal expansion and excursion. 

Cardiovascular:  Regular rate and rhythm. 

Pulmonary:  Clear to auscultation bilaterally. 

Abdomen:  Soft, somewhat scaphoid, nondistended.  No rebound.  No guarding.  No focal peritonitis.  N
o peritoneal signs.  No hernias appreciated. 

Extremities:  No clubbing, cyanosis, or edema. 

Skin:  Warm and dry.



Laboratory Data:  Reveals a white blood count of 8.3, hemoglobin 12.8, hematocrit 36.4, platelet coun
t is 298.  His sodium is 140, potassium 3.6, chloride 108 carbon dioxide 26, BUN 10, creatinine 0.9, 
glucose 124.  Total bilirubin is 0.7, AST 13, ALT 23, alkaline phosphatase is 38.  His lipase was 103
 on admission.  His UA was positive for benzos and THC on this particular tox screen.  He had a CT sc
an of the abdomen and pelvis, officially read as small bilateral calyceal stones without hydronephros
is.



Assessment And Plan:  This is a 28-year-old male, who comes in with abdominal pain of uncertain etiol
ogy.

1.IV fluid hydration.

2.Start clear liquid diet and advance as tolerated.  When patient meets nutritional goals and pain i
s improved, patient should be discharged.  I have recommended the patient has outpatient EGD and poss
ibly colonoscopy and continue workup as an outpatient should he 

meet his nutritional goals. 

Thank you for this interesting consult.





KARLI/KIMMY

DD:  12/03/2018 12:15:43Voice ID:  [id number]

DT:  12/03/2018 16:52:58Report ID:  [id number]

## 2018-12-04 NOTE — DS
Date of Discharge:  12/03/2018



Consultants:  Dr. Vidal with General Surgery.



Discharge Diagnoses:  

1.Generalized abdominal pain.

2.Intractable nausea, vomiting, cyclical, likely related to marijuana abuse.

3.Polysubstance abuse with marijuana and cocaine, benzodiazepines.

4.Neutrophilic leukocytosis, resolved.



Hospital Course:  Patient is a 28-year-old male, who was recently discharged from the hospital with a
bdominal pain, acute kidney injury, and dehydration, comes in again with similar symptoms of abdomina
l pain.  Patient's UDS again positive for marijuana as well as benzodiazepine, however, not cocaine t
his time around.  CT scan was repeated, which was negative.  Patient was started on IV fluids, IV iman
n medications, and symptomatic treatment.  Family was at the bedside.  Treatment plan was explained. 
 All questions were answered.  Patient was again counseled regarding cyclical vomiting syndrome and a
bdominal discomfort related to marijuana abuse.  Patient voiced understanding; however, patient has a
 history of current use as displayed by these past 2 hospitalizations.  Patient was then seen by Dr. Vidal of General Surgery.  He was able to tolerate a clear liquid diet.  No surgical intervention w
as planned.  Patient was then recommended to be discharged.  He was stable and tolerating a diet.  No
 further nausea or vomiting.  Again, counseled extensively.  Patient is to establish care with PCP an
d to return to the ER for worsening condition.



Physical Examination:

General:  Awake, alert, oriented x3, not in any acute distress. 

Cardiovascular:  S1, S2.  No murmurs. 

Respiratory:  Moving air well bilaterally. 

Gastrointestinal:  Abdomen is soft, nontender, nondistended.  Positive bowel sounds.  No guarding or 
rigidity. 

Extremities:  No clubbing, cyanosis, or edema. 

Neurologic:  Nonfocal.





SA/MODL

DD:  12/03/2018 11:10:54Voice ID:  [id number]

DT:  12/04/2018 06:45:05Report ID:  [id number]

## 2019-01-15 ENCOUNTER — HOSPITAL ENCOUNTER (OUTPATIENT)
Dept: HOSPITAL 97 - ER | Age: 29
Setting detail: OBSERVATION
LOS: 1 days | Discharge: HOME | End: 2019-01-16
Attending: HOSPITALIST | Admitting: HOSPITALIST
Payer: COMMERCIAL

## 2019-01-15 VITALS — BODY MASS INDEX: 16.9 KG/M2

## 2019-01-15 DIAGNOSIS — Z87.891: ICD-10-CM

## 2019-01-15 DIAGNOSIS — Z87.442: ICD-10-CM

## 2019-01-15 DIAGNOSIS — F19.20: Primary | ICD-10-CM

## 2019-01-15 DIAGNOSIS — N17.9: ICD-10-CM

## 2019-01-15 DIAGNOSIS — Z86.73: ICD-10-CM

## 2019-01-15 DIAGNOSIS — R11.2: ICD-10-CM

## 2019-01-15 DIAGNOSIS — E86.0: ICD-10-CM

## 2019-01-15 DIAGNOSIS — R10.9: ICD-10-CM

## 2019-01-15 PROCEDURE — 80076 HEPATIC FUNCTION PANEL: CPT

## 2019-01-15 PROCEDURE — 93005 ELECTROCARDIOGRAM TRACING: CPT

## 2019-01-15 PROCEDURE — 99285 EMERGENCY DEPT VISIT HI MDM: CPT

## 2019-01-15 PROCEDURE — 80048 BASIC METABOLIC PNL TOTAL CA: CPT

## 2019-01-15 PROCEDURE — 96361 HYDRATE IV INFUSION ADD-ON: CPT

## 2019-01-15 PROCEDURE — 36415 COLL VENOUS BLD VENIPUNCTURE: CPT

## 2019-01-15 PROCEDURE — 71045 X-RAY EXAM CHEST 1 VIEW: CPT

## 2019-01-15 PROCEDURE — 96375 TX/PRO/DX INJ NEW DRUG ADDON: CPT

## 2019-01-15 PROCEDURE — 83690 ASSAY OF LIPASE: CPT

## 2019-01-15 PROCEDURE — 81003 URINALYSIS AUTO W/O SCOPE: CPT

## 2019-01-15 PROCEDURE — 85610 PROTHROMBIN TIME: CPT

## 2019-01-15 PROCEDURE — 83880 ASSAY OF NATRIURETIC PEPTIDE: CPT

## 2019-01-15 PROCEDURE — 83735 ASSAY OF MAGNESIUM: CPT

## 2019-01-15 PROCEDURE — 80307 DRUG TEST PRSMV CHEM ANLYZR: CPT

## 2019-01-15 PROCEDURE — 74176 CT ABD & PELVIS W/O CONTRAST: CPT

## 2019-01-15 PROCEDURE — 85025 COMPLETE CBC W/AUTO DIFF WBC: CPT

## 2019-01-15 PROCEDURE — 96374 THER/PROPH/DIAG INJ IV PUSH: CPT

## 2019-01-15 PROCEDURE — 84484 ASSAY OF TROPONIN QUANT: CPT

## 2019-01-15 NOTE — XMS REPORT
[facility]

 Created on:2019



Patient:Zhang Quijano

Sex:Male

:1990

External Reference #:[id number]





Demographics







 Address  [address] [place] [address]

 

 Phone  [phone]

 

 Preferred Language  en

 

 Marital Status  Unknown

 

 Holiness Affiliation  Unknown

 

 Race  White

 

 Ethnic Group  Unknown









Author







 Organization  [employer]









Care Team Providers







 Name  Role  Phone

 

 Andrés Goncalves  Provider Role  Unavailable









Allergies, Adverse Reactions, Alerts







 Substance  Reaction  Event Type

 

 Neurontin  hives  Drug Allergy







Problems







 Problem Type  Condition  Code  Onset Dates  Condition Status

 

 Assessment  Tobacco abuse  Z72.0    Active

 

 Problem  Polysubstance abuse  F19.10    Active

 

 Problem  DTs (delirium tremens)  F10.231    Active

 

 Problem  Anxiety  F41.9    Active

 

 Assessment  Polysubstance abuse  F19.10    Active

 

 Assessment  History of alcohol abuse  Z87.898    Active

 

 Assessment  Anxiety  F41.9    Active







Medications







 Medication  Code  Code  Instructions  Start  End  Status  Dosage



   System      Date  Date    

 

 BusPIRone HCl  NDC  [id number]  10 MG Orally      Active  1 tablet



       Twice a day        

 

 HydrOXYzine HCl  NDC  [id number]  25 MG Orally  Dec 11,    Active  1 tablet



       every 8 hrs  2018      as needed







Results

No Known Results



Summary Purpose

[employer] Submission

## 2019-01-16 VITALS — DIASTOLIC BLOOD PRESSURE: 95 MMHG | SYSTOLIC BLOOD PRESSURE: 126 MMHG | TEMPERATURE: 97.8 F

## 2019-01-16 VITALS — OXYGEN SATURATION: 99 %

## 2019-01-16 LAB
ALBUMIN SERPL BCP-MCNC: 5.4 G/DL (ref 3.4–5)
ALP SERPL-CCNC: 64 U/L (ref 45–117)
ALT SERPL W P-5'-P-CCNC: 32 U/L (ref 12–78)
AST SERPL W P-5'-P-CCNC: 26 U/L (ref 15–37)
BUN BLD-MCNC: 21 MG/DL (ref 7–18)
GLUCOSE SERPLBLD-MCNC: 153 MG/DL (ref 74–106)
HCT VFR BLD CALC: 48.4 % (ref 39.6–49)
INR BLD: 1.05
LIPASE SERPL-CCNC: 71 U/L (ref 73–393)
LYMPHOCYTES # SPEC AUTO: 2.6 K/UL (ref 0.7–4.9)
MAGNESIUM SERPL-MCNC: 2.2 MG/DL (ref 1.8–2.4)
METHAMPHET UR QL SCN: NEGATIVE
NT-PROBNP SERPL-MCNC: 122 PG/ML (ref ?–125)
PMV BLD: 8.5 FL (ref 7.6–11.3)
POTASSIUM SERPL-SCNC: 3.5 MMOL/L (ref 3.5–5.1)
RBC # BLD: 5.07 M/UL (ref 4.33–5.43)
THC SERPL-MCNC: POSITIVE NG/ML
TROPONIN (EMERG DEPT USE ONLY): < 0.02 NG/ML (ref 0–0.04)

## 2019-01-16 RX ADMIN — ONDANSETRON PRN MG: 2 INJECTION INTRAMUSCULAR; INTRAVENOUS at 04:27

## 2019-01-16 RX ADMIN — ONDANSETRON PRN MG: 2 INJECTION INTRAMUSCULAR; INTRAVENOUS at 08:25

## 2019-01-16 NOTE — RAD REPORT
EXAM DESCRIPTION:  CT - Abdomen   Pelvis Wo Contrast - 1/16/2019 5:55 am

 

CLINICAL HISTORY:  Abdominal  pain vomiting

 

COMPARISON:  December 2018

 

TECHNIQUE:  Computed axial tomography of the abdomen and pelvis was obtained. IV was not requested. O
ral contrast was given. Coronal reconstructions performed.A preliminary report was generated by noemi caldera radiologic and review prior to dictation

 

All CT scans are performed using dose optimization technique as appropriate and may include automated
 exposure control or mA/KV adjustment according to patient size.

 

FINDINGS:   The evaluation of solid organs and vessels is limited secondary to the lack of contrast a
dministration.

 

A punctate bilateral renal calculi are present. Hydronephrosis is not present. Eight ureteral calculu
s is not seen. A bladder calculus is not visualized

 

Liver, spleen, pancreas and adrenals appear grossly normal

 

The appendix is not seen. There is no evidence of diverticulitis.

 

IMPRESSION:  Tiny nonobstructing renal calculi

## 2019-01-16 NOTE — RAD REPORT
EXAM DESCRIPTION:  Karina Single View1/16/2019 12:29 am

 

CLINICAL HISTORY:  Cough

 

COMPARISON:  October 2018

 

FINDINGS:   The lungs appear clear of acute infiltrate. The heart is normal size

 

IMPRESSION:   No acute abnormalities displayed

## 2019-01-16 NOTE — EKG
Test Date:    2019-01-16               Test Time:    00:56:05

Technician:   RR                                     

                                                     

MEASUREMENT RESULTS:                                       

Intervals:                                           

Rate:         81                                     

SD:           150                                    

QRSD:         82                                     

QT:           376                                    

QTc:          436                                    

Axis:                                                

P:            73                                     

SD:           150                                    

QRS:          88                                     

T:            66                                     

                                                     

INTERPRETIVE STATEMENTS:                                       

                                                     

Normal sinus rhythm

Normal ECG

Compared to ECG 10/05/2018 02:22:35

Sinus arrhythmia no longer present



Electronically Signed On 01-16-19 10:21:30 CST by Walt Mendoza

## 2019-01-16 NOTE — ER
Nurse's Notes                                                                                     

 [facility]                                                                

Name: Zhang Quijano                                                                                

Age: 28 yrs                                                                                       

Sex: Male                                                                                         

: 1990                                                                                   

MRN: [id number]                                                                                   

Arrival Date: 01/15/2019                                                                          

Time: 23:43                                                                                       

Account#: [id number]                                                                            

Bed 15                                                                                            

Private MD:                                                                                       

Diagnosis: Vomiting;Abdominal tenderness;Essential (primary) hypertension                         

                                                                                                  

Presentation:                                                                                     

01/15                                                                                             

23:52 Presenting complaint: Patient states: pt states that he has been vomiting for the last  tl3 

      two or three days, no fever or diarrhea. States that his kidneys are hurting him.           

      Transition of care: patient was not received from another setting of care. Onset of         

      symptoms was 2019. Risk Assessment: Do you want to hurt yourself or someone     

      else? Patient reports no desire to harm self or others. Initial Sepsis Screen: Does the     

      patient meet any 2 criteria? No. Patient's initial sepsis screen is negative. Does the      

      patient have a suspected source of infection? No. Patient's initial sepsis screen is        

      negative. Care prior to arrival: None.                                                      

23:52 Method Of Arrival: Ambulatory                                                           tl3 

23:52 Acuity: SUZETTE 3                                                                           tl3 

                                                                                                  

Triage Assessment:                                                                                

23:54 General: Appears distressed, uncomfortable, Behavior is cooperative, anxious. Pain:     tl3 

      Complains of pain in back and abdomen Pain currently is 10 out of 10 on a pain scale.       

                                                                                                  

Historical:                                                                                       

- Allergies:                                                                                      

23:54 Neurontin;                                                                              tl3 

- Home Meds:                                                                                      

23:54 None [Active];                                                                          tl3 

- PMHx:                                                                                           

23:54 Anxiety; Back pain; Kidney stones;                                                      tl3 

- PSHx:                                                                                           

23:54 None;                                                                                   tl3 

                                                                                                  

- Immunization history:: Adult Immunizations up to date.                                          

- Social history:: Smoking status: Patient uses tobacco products, smokes one pack                 

  cigarettes per day.                                                                             

- Ebola Screening: : No symptoms or risks identified at this time.                                

- Family history:: not pertinent.                                                                 

                                                                                                  

                                                                                                  

Screenin/16                                                                                             

00:30 Abuse screen: Denies threats or abuse. Denies injuries from another. Nutritional        rr5 

      screening: No deficits noted. Tuberculosis screening: No symptoms or risk factors           

      identified. Fall Risk IV access (20 points). Gait- Normal/Bed Rest/Wheelchair (0 pts)       

      Total Sheldon Fall Scale indicates No Risk (0-24 pts).                                        

                                                                                                  

Assessment:                                                                                       

00:00 General: Appears uncomfortable, ill, Behavior is calm, cooperative, appropriate for     rr5 

      age. Pain: Complains of pain in abdomen Pain radiates to flank area bilateral Pain          

      currently is 10 out of 10 on a pain scale. Quality of pain is described as aching,          

      sharp, Pain began gradually, Is intermittent. Neuro: Level of Consciousness is awake,       

      alert, obeys commands, Oriented to person, place, time, situation, Appropriate for age.     

      Neuro:. Cardiovascular: Capillary refill < 3 seconds Patient's skin is warm and dry.        

      Respiratory: Airway is patent Respiratory effort is even, unlabored, Respiratory            

      pattern is regular, symmetrical. GI: Abdomen is flat, Reports lower abdominal pain,         

      upper abdominal pain, nausea, vomiting. : No signs and/or symptoms were reported          

      regarding the genitourinary system. EENT: No signs and/or symptoms were reported            

      regarding the EENT system. Derm: Skin is intact, Skin temperature is warm.                  

      Musculoskeletal: Capillary refill < 3 seconds, Range of motion: intact in all               

      extremities.                                                                                

01:00 Reassessment: Patient appears in no apparent distress at this time. No changes from     rr5 

      previously documented assessment. Patient denies pain at this time. Patient states          

      feeling better. Patient states symptoms have improved.                                      

01:51 Reassessment: Patient appears in no apparent distress at this time. Patient is alert,   rr5 

      oriented x 3, equal unlabored respirations, skin warm/dry/pink. asleep comfortably on       

      bed Patient denies pain at this time. Patient states feeling better. Patient states         

      symptoms have improved.                                                                     

02:00 Reassessment: pain came back I'm hurting, pain score 10/10, facial grimace noted. ED    rr5 

      provider informed.                                                                          

02:37 Reassessment: Patient appears in no apparent distress at this time. Patient is alert,   rr5 

      oriented x 3, equal unlabored respirations, skin warm/dry/pink. Patient states feeling      

      better.                                                                                     

                                                                                                  

Vital Signs:                                                                                      

01/15                                                                                             

23:54  / 121; Pulse 123; Resp 22; Temp 98(O); Pulse Ox 97% ;                            tl3 

                                                                                             

01:00  / 103; Pulse 88; Resp 17; Pulse Ox 100% ;                                        rr5 

01:52  / 82; Pulse 89; Resp 17; Pulse Ox 99% ;                                          rr5 

03:00  / 86; Pulse 83; Resp 17; Pulse Ox 99% ;                                          rr5 

                                                                                                  

ED Course:                                                                                        

01/15                                                                                             

23:43 Patient arrived in ED.                                                                  am2 

23:53 Triage completed.                                                                       tl3 

23:54 Arm band placed on left wrist.                                                          tl3 

                                                                                             

00:00 Patient has correct armband on for positive identification. Placed in gown. Bed in low  rr5 

      position. Call light in reach. Side rails up X2. Cardiac monitor on. Pulse ox on. NIBP      

      on.                                                                                         

00:01 Lenin Butler MD is Attending Physician.                                             rylan 

00:04 Anthony Mccallum RN is Primary Nurse.                                                    rr5 

00:04 Inserted saline lock: 20 gauge in right forearm, using aseptic technique. Blood         rr5 

      collected.                                                                                  

00:29 X-ray completed. Portable x-ray completed in exam room. Patient tolerated procedure     kw  

      well.                                                                                       

01:14 Iris Gan MD is Hospitalizing Provider.                                           [facility] 

02:04 No provider procedures requiring assistance completed. Patient admitted, IV remains in  rr5 

      place. intact, No redness/swelling at site.                                                 

02:59 CT completed. Patient tolerated procedure well. Patient moved to CT via wheelchair.       

      Patient moved back from CT.                                                                 

                                                                                                  

Administered Medications:                                                                         

00:25 Drug: NS 0.9% 1000 ml Route: IV; Rate: 1 bolus; Site: right forearm;                    rr5 

01:08 Follow up: Response: No adverse reaction; IV Status: Completed infusion; IV Intake:     rr5 

      1000ml                                                                                      

00:26 Drug: Zofran 4 mg Route: IVP; Site: right forearm;                                      rr5 

03:15 Follow up: Response: No adverse reaction                                                rr5 

00:27 Drug: ProTONIX 40 mg Route: IVP; Site: right forearm;                                   rr5 

03:15 Follow up: Response: No adverse reaction                                                rr5 

00:30 Drug: Dilaudid 1 mg Route: IVP; Site: right forearm;                                    rr5 

03:15 Follow up: Response: No adverse reaction                                                rr5 

01:09 Drug: NS 0.9% 1000 ml Route: IV; Rate: 1 bolus; Site: right forearm;                    rr5 

02:25 Follow up: Response: No adverse reaction; IV Status: Completed infusion; IV Intake:     rr5 

      1000ml                                                                                      

02:00 Drug: Zofran 4 mg Route: IVP; Site: right forearm;                                      rr5 

03:13 Follow up: Response: No adverse reaction                                                rr5 

02:02 Drug: Dilaudid 1 mg Route: IVP; Site: right forearm;                                    rr5 

03:14 Follow up: Response: No adverse reaction                                                rr5 

02:30 Drug: NS 0.9% 1000 ml Route: IV; Rate: 125 ml/hr; Site: right forearm;                  rr5 

03:14 Follow up: Response: No adverse reaction; IV Status: Infusion continued upon admission  rr5 

                                                                                                  

                                                                                                  

Intake:                                                                                           

01:08 IV: 1000ml; Total: 1000ml.                                                              rr5 

02:25 IV: 1000ml; Total: 2000ml.                                                              rr5 

                                                                                                  

Outcome:                                                                                          

01:17 Decision to Hospitalize by Provider.                                                    rylan 

03:12 Admitted to Tele accompanied by nurse, via wheelchair, room 406, with chart, Report     rr5 

      called to  sondra TABOR                                                                      

03:12 Condition: stable                                                                           

03:12 Instructed on the need for admit.                                                           

03:33 Patient left the ED.                                                                    rr5 

                                                                                                  

Signatures:                                                                                       

Lenin Butler MD MD cha Hagler, Rani Gonzalez Amanda am2 Lowrey, Tammy, LEANDER                       RN   tl3                                                  

Anthony Mccallum RN                      RN   rr5                                                  

                                                                                                  

Corrections: (The following items were deleted from the chart)                                    

02:27 02:05 Reassessment: pain came back I'm hurting, pain score 10/10, facial grimace noted. rr5 

      ED provider informed. rr5                                                                   

                                                                                                  

**************************************************************************************************

## 2019-01-16 NOTE — EDPHYS
Physician Documentation                                                                           

 [facility]                                                                

Name: Zhang Quijano                                                                                

Age: 28 yrs                                                                                       

Sex: Male                                                                                         

: 1990                                                                                   

MRN: [id number]                                                                                   

Arrival Date: 01/15/2019                                                                          

Time: 23:43                                                                                       

Account#: [id number]                                                                            

Bed 15                                                                                            

Private MD:                                                                                       

ED Physician Lenin Butler                                                                      

HPI:                                                                                              

                                                                                             

00:14 This 28 yrs old  Male presents to ER via Ambulatory with complaints of         rylan 

      dehydration.                                                                                

00:14 The patient presents with abdominal pain. Onset: The symptoms/episode began/occurred 3  rylan 

      day(s) ago. The patient presents with pain that is acute. The symptoms are located in       

      the mid back area. Onset: The symptoms/episode began/occurred 3 day(s) ago. The pain        

      does not radiate. Associated signs and symptoms: Pertinent positives: abdominal pain,       

      nausea, vomiting. Modifying factors: The patient symptoms are alleviated by nothing,        

      the patient symptoms are aggravated by nothing. Severity of symptoms: At their worst        

      the symptoms were moderate, in the emergency department the symptoms are unchanged.         

                                                                                                  

Historical:                                                                                       

- Allergies:                                                                                      

01/15                                                                                             

23:54 Neurontin;                                                                              tl3 

- Home Meds:                                                                                      

23:54 None [Active];                                                                          tl3 

- PMHx:                                                                                           

23:54 Anxiety; Back pain; Kidney stones;                                                      tl3 

- PSHx:                                                                                           

23:54 None;                                                                                   tl3 

                                                                                                  

- Immunization history:: Adult Immunizations up to date.                                          

- Social history:: Smoking status: Patient uses tobacco products, smokes one pack                 

  cigarettes per day.                                                                             

- Ebola Screening: : No symptoms or risks identified at this time.                                

- Family history:: not pertinent.                                                                 

                                                                                                  

                                                                                                  

ROS:                                                                                              

                                                                                             

00:14 Constitutional: Negative for fever, chills, and weight loss, Eyes: Negative for injury, rylan 

      pain, redness, and discharge, ENT: Negative for injury, pain, and discharge, Neck:          

      Negative for injury, pain, and swelling, Respiratory: Negative for shortness of breath,     

      cough, wheezing, and pleuritic chest pain, : Negative for injury, bleeding,               

      discharge, and swelling, MS/Extremity: Negative for injury and deformity, Skin:             

      Negative for injury, rash, and discoloration, Neuro: Negative for headache, weakness,       

      numbness, tingling, and seizure.                                                            

      Cardiovascular: Positive for chest pain.                                                    

      Respiratory: Positive for cough, with no reported sputum.                                   

      Abdomen/GI: Positive for abdominal pain, nausea and vomiting, abdominal cramps, of the      

      epigastric area, right upper quadrant and left upper quadrant.                              

                                                                                                  

Exam:                                                                                             

00:14 Constitutional:  This is a well developed, well nourished patient who is awake, alert,  rylan 

      and in no acute distress. Head/Face:  Normocephalic, atraumatic. Eyes:  Pupils equal        

      round and reactive to light, extra-ocular motions intact.  Lids and lashes normal.          

      Conjunctiva and sclera are non-icteric and not injected.  Cornea within normal limits.      

      Periorbital areas with no swelling, redness, or edema. ENT:  Nares patent. No nasal         

      discharge, no septal abnormalities noted.  Tympanic membranes are normal and external       

      auditory canals are clear.  Oropharynx with no redness, swelling, or masses, exudates,      

      or evidence of obstruction, uvula midline.  Mucous membranes moist. Neck:  Trachea          

      midline, no thyromegaly or masses palpated, and no cervical lymphadenopathy.  Supple,       

      full range of motion without nuchal rigidity, or vertebral point tenderness.  No            

      Meningismus. Chest/axilla:  Normal chest wall appearance and motion.  Nontender with no     

      deformity.  No lesions are appreciated. Cardiovascular:  Regular rate and rhythm with a     

      normal S1 and S2.  No gallops, murmurs, or rubs.  Normal PMI, no JVD.  No pulse             

      deficits. Respiratory:  Lungs have equal breath sounds bilaterally, clear to                

      auscultation and percussion.  No rales, rhonchi or wheezes noted.  No increased work of     

      breathing, no retractions or nasal flaring. Abdomen/GI:  Soft, non-tender, with normal      

      bowel sounds.  No distension or tympany.  No guarding or rebound.  No evidence of           

      tenderness throughout. Back:  No spinal tenderness.  No costovertebral tenderness.          

      Full range of motion. Skin:  Warm, dry with normal turgor.  Normal color with no            

      rashes, no lesions, and no evidence of cellulitis. MS/ Extremity:  Pulses equal, no         

      cyanosis.  Neurovascular intact.  Full, normal range of motion. Neuro:  Awake and           

      alert, GCS 15, oriented to person, place, time, and situation.  Cranial nerves II-XII       

      grossly intact.  Motor strength 5/5 in all extremities.  Sensory grossly intact.            

      Cerebellar exam normal.  Normal gait. Psych:  Awake, alert, with orientation to person,     

      place and time.  Behavior, mood, and affect are within normal limits.                       

                                                                                                  

Vital Signs:                                                                                      

01/15                                                                                             

23:54  / 121; Pulse 123; Resp 22; Temp 98(O); Pulse Ox 97% ;                            tl3 

                                                                                             

01:00  / 103; Pulse 88; Resp 17; Pulse Ox 100% ;                                        rr5 

01:52  / 82; Pulse 89; Resp 17; Pulse Ox 99% ;                                          rr5 

03:00  / 86; Pulse 83; Resp 17; Pulse Ox 99% ;                                          rr5 

                                                                                                  

MDM:                                                                                              

00:01 Patient medically screened.                                                             [facility] 

00:17 Data reviewed: vital signs, nurses notes, lab test result(s), EKG, radiologic studies.  [facility] 

                                                                                                  

                                                                                             

00:13 Order name: Basic Metabolic Panel                                                       [facility] 

                                                                                             

00:13 Order name: CBC with Diff                                                               [facility] 

                                                                                             

00:13 Order name: LFT's                                                                       [facility] 

                                                                                             

00:13 Order name: Magnesium                                                                   [facility] 

                                                                                             

00:13 Order name: NT PRO-BNP                                                                  [facility] 

                                                                                             

00:13 Order name: PT-INR                                                                      [facility] 

                                                                                             

00:13 Order name: Troponin (emerg Dept Use Only)                                              [facility] 

                                                                                             

00:13 Order name: Lipase                                                                      [facility] 

                                                                                             

00:19 Order name: Urine Dipstick--Ancillary (enter results)                                     

                                                                                             

00:34 Order name: UDS                                                                         [facility] 

                                                                                             

00:49 Order name: Urine Dipstick-Ancillary; Complete Time: 01:07                              [facility]

                                                                                             

00:52 Order name: CBC with Automated Diff; Complete Time: 01:07                               [facility]

                                                                                             

00:53 Order name: Protime (+INR); Complete Time: 01:07                                        [facility]

                                                                                             

00:54 Order name: Basic Metabolic Panel; Complete Time: 01:07                                 [facility]

                                                                                             

00:13 Order name: XRAY Chest (1 view)                                                         [facility] 

                                                                                             

00:13 Order name: CT Abd/Pelvis - W/Contrast                                                  [facility] 

                                                                                             

00:54 Order name: Liver (Hepatic) Function; Complete Time: 01:07                              [facility]

                                                                                             

00:54 Order name: Troponin (Emerg Dept Use Only); Complete Time: 01:07                        [facility]

                                                                                             

00:54 Order name: NT PRO-BNP; Complete Time: 01:07                                            [facility]

                                                                                             

00:54 Order name: Magnesium; Complete Time: 01:07                                             [facility]

                                                                                             

00:54 Order name: Lipase; Complete Time: 01:07                                                [facility]

                                                                                             

02:08 Order name: Urine Drug Screen                                                           [facility]

                                                                                             

00:13 Order name: EKG; Complete Time: 00:14                                                   [facility] 

                                                                                             

00:13 Order name: Cardiac monitoring; Complete Time: 01:06                                    [facility] 

                                                                                             

00:13 Order name: EKG - Nurse/Tech; Complete Time: 01:06                                      [facility] 

                                                                                             

00:13 Order name: IV Saline Lock; Complete Time: 00:45                                        [facility] 

                                                                                             

00:13 Order name: Labs collected and sent; Complete Time: 00:45                               [facility] 

                                                                                             

00:13 Order name: O2 Per Protocol; Complete Time: 00:45                                       [facility] 

                                                                                             

00:13 Order name: O2 Sat Monitoring; Complete Time: 00:45                                     [facility] 

                                                                                                  

Administered Medications:                                                                         

00:25 Drug: NS 0.9% 1000 ml Route: IV; Rate: 1 bolus; Site: right forearm;                    rr5 

01:08 Follow up: Response: No adverse reaction; IV Status: Completed infusion; IV Intake:     rr5 

      1000ml                                                                                      

00:26 Drug: Zofran 4 mg Route: IVP; Site: right forearm;                                      rr5 

03:15 Follow up: Response: No adverse reaction                                                rr5 

00:27 Drug: ProTONIX 40 mg Route: IVP; Site: right forearm;                                   rr5 

03:15 Follow up: Response: No adverse reaction                                                rr5 

00:30 Drug: Dilaudid 1 mg Route: IVP; Site: right forearm;                                    rr5 

03:15 Follow up: Response: No adverse reaction                                                rr5 

01:09 Drug: NS 0.9% 1000 ml Route: IV; Rate: 1 bolus; Site: right forearm;                    rr5 

02:25 Follow up: Response: No adverse reaction; IV Status: Completed infusion; IV Intake:     rr5 

      1000ml                                                                                      

02:00 Drug: Zofran 4 mg Route: IVP; Site: right forearm;                                      rr5 

03:13 Follow up: Response: No adverse reaction                                                rr5 

02:02 Drug: Dilaudid 1 mg Route: IVP; Site: right forearm;                                    rr5 

03:14 Follow up: Response: No adverse reaction                                                rr5 

02:30 Drug: NS 0.9% 1000 ml Route: IV; Rate: 125 ml/hr; Site: right forearm;                  rr5 

03:14 Follow up: Response: No adverse reaction; IV Status: Infusion continued upon admission  rr5 

                                                                                                  

                                                                                                  

Disposition:                                                                                      

19 01:17 Hospitalization ordered by Iris Gan for Inpatient Admission. Preliminary     

  diagnosis are Vomiting, Abdominal tenderness, Essential (primary)                               

  hypertension.                                                                                   

- Bed requested for Telemetry/MedSurg (Inpatient).                                                

- Status is Inpatient Admission.                                                              rr5 

- Condition is Fair.                                                                              

- Problem is new.                                                                                 

- Symptoms have improved.                                                                         

UTI on Admission? No                                                                              

                                                                                                  

                                                                                                  

                                                                                                  

Signatures:                                                                                       

Dispatcher [employer]                           EDMS                                                 

Lenin Butler MD MD cha Garcia, Cindy, RN                       RN   cg                                                   

Sherry Garvin RN                       RN   tl3                                                  

Anthony Mccallum RN                      RN   rr5                                                  

                                                                                                  

Corrections: (The following items were deleted from the chart)                                    

02:02 01:17 Hospitalization Ordered by Iris Gan MD for Inpatient Admission. Preliminary  cg  

      diagnosis is Vomiting; Abdominal tenderness; Essential (primary) hypertension. Bed          

      requested for Telemetry/MedSurg (Inpatient). Status is Inpatient Admission. Condition       

      is Fair. Problem is new. Symptoms have improved. UTI on Admission? No. rylan                  

03:33 02:02 2019 01:17 Hospitalization Ordered by Iris Gan MD for Inpatient        rr5 

      Admission. Preliminary diagnosis is Vomiting; Abdominal tenderness; Essential (primary)     

      hypertension. Bed requested for Telemetry/MedSurg (Inpatient). Status is Inpatient          

      Admission. Condition is Fair. Problem is new. Symptoms have improved. UTI on Admission?     

      No. cg                                                                                      

                                                                                                  

**************************************************************************************************

## 2019-01-16 NOTE — P.HP
Certification for Inpatient


Patient admitted to: Observation


With expected LOS: <2 Midnights


Patient will require the following post-hospital care: None


Practitioner: I am a practitioner with admitting privileges, knowledge of 

patient current condition, hospital course, and medical plan of care.


Services: Services provided to patient in accordance with Admission 

requirements found in Title 42 Section 412.3 of the Code of Federal Regulations





Patient History


Date of Service: 01/16/19


Reason for admission: Dehydration; intractable nausea and vomiting


History of Present Illness: 





Patient is a 28-year-old gentleman who came into the hospital with persistent 

nausea and vomiting.  Patient's pain is in the epigastric region.  Patient has 

a history of polysubstance abuse.  He has been doing marijuana yearly since 

2015.  Patient has had multiple episodes for intractable nausea and vomiting.  

However, he states he has not used any drugs since New Year's Day.  I did speak 

to him about his urine drug screen.  He states that he last used anything 

illegal on the 1st of January.  He was doing well after the new years, but he 

had flu-like symptoms 4-5 days later.  Then a couple of days ago his nausea and 

vomiting started.  He was seen by what he states was a clinic on [facility].  They 

did a blood test which showed that he may have what appeared like a stomach 

infection according to the patient.  He was not feeling any better over the 

last 24hrs. He decided to come in to the emergency room for further evaluation.


Allergies





ketorolac [From Toradol] Allergy (Verified 01/16/19 03:56)


 Hives/Rash





Home Medications: 








Buspirone HCl [Buspar] 5 mg PO TID #30 tab 12/03/18 








- Past Medical/Surgical History


Has patient received pneumonia vaccine in the past: No


Diabetic: No


-: wrist fracture


-: TIA with aphasia (7/15)


-: anxiety


-: back pain


-: kidney stones


-: club foot


-: R wrist fracture


-: appendectomy





- Family History


  ** Mother


Medical History: Cancer


Notes: lung cx





- Social History


Smoking Status: Former smoker


Alcohol use: No


CD- Drugs: Yes


Caffeine use: Yes


Place of Residence: Home





Review of Systems


10-point ROS is otherwise unremarkable





Physical Examination





- Vital Signs


Temperature: 98 F


Blood Pressure: 120/86


Pulse: 83


Respirations: 17


Pulse Ox (%): 96





- Physical Exam


General: Alert, In no apparent distress, Oriented x3


HEENT: Atraumatic, PERRLA, Mucous membr. moist/pink, EOMI, Sclerae nonicteric


Neck: Supple, 2+ carotid pulse no bruit, No LAD, Without JVD or thyroid 

abnormality


Respiratory: Clear to auscultation bilaterally, Normal air movement


Cardiovascular: Regular rate/rhythm, Normal S1 S2, No murmurs


Gastrointestinal: Normal bowel sounds, Soft and benign, Non-distended, No 

tenderness


Musculoskeletal: No clubbing, No swelling, No tenderness


Integumentary: No rashes


Neurological: Normal gait, Normal speech, Normal strength at 5/5 x4 extr, 

Normal tone, Sensation intact, Cranial nerves 3-12 intact, Normal affect


Lymphatics: No axilla or inguinal lymphadenopathy





- Studies


Laboratory Data (last 24 hrs)





01/16/19 00:05: PT 12.4, INR 1.05


01/16/19 00:05: WBC 11.6 H, Hgb 16.9, Hct 48.4, Plt Count 391


01/16/19 00:05: Sodium 133 L, Potassium 3.5, BUN 21 H, Creatinine 2.24 H, 

Glucose 153 H, Magnesium 2.2, Total Bilirubin 0.9, AST 26, ALT 32, Alkaline 

Phosphatase 64, Lipase 71 L








Assessment & Plan





- Problems (Diagnosis)


(1) Polysubstance (excluding opioids) dependence


Current Visit: Yes   Status: Acute   





(2) Dehydration


Current Visit: Yes   Status: Acute   





(3) Abdominal pain


Onset Date: 10/19/15   Current Visit: No   Status: Acute   


Qualifiers: 


 





(4) Acute renal injury


Onset Date: 11/30/18   Current Visit: No   Status: Acute   





(5) Nausea & vomiting


Onset Date: 10/19/15   Current Visit: No   Status: Acute   


Qualifiers: 


 





- Plan





1. Continue with IV hydration 


2. Continue with IV antiemetics


3. Continue with pain control


4. NPO; start clear liquids gradually


5. GI consultation as an outpatient 


6. Stool cultures


7. GI and DVT prophylaxis


Discharge Plan: Home


Plan to discharge in: 48 Hours





- Advance Directives


Does patient have a Living Will: No


Does patient have a Durable POA for Healthcare: No





- Code Status/Comfort Care


Code Status Assessed: Yes


Code Status: Full Code


Critical Care: No


Time Spent Managing PTS Care (In Minutes): 50

## 2019-03-03 ENCOUNTER — HOSPITAL ENCOUNTER (EMERGENCY)
Dept: HOSPITAL 97 - ER | Age: 29
Discharge: HOME | End: 2019-03-03
Payer: COMMERCIAL

## 2019-03-03 VITALS — OXYGEN SATURATION: 100 %

## 2019-03-03 VITALS — SYSTOLIC BLOOD PRESSURE: 146 MMHG | DIASTOLIC BLOOD PRESSURE: 88 MMHG

## 2019-03-03 VITALS — TEMPERATURE: 97.6 F

## 2019-03-03 DIAGNOSIS — R11.10: ICD-10-CM

## 2019-03-03 DIAGNOSIS — F12.10: Primary | ICD-10-CM

## 2019-03-03 DIAGNOSIS — F17.210: ICD-10-CM

## 2019-03-03 LAB
ALBUMIN SERPL BCP-MCNC: 5.6 G/DL (ref 3.4–5)
ALP SERPL-CCNC: 74 U/L (ref 45–117)
ALT SERPL W P-5'-P-CCNC: 24 U/L (ref 12–78)
AST SERPL W P-5'-P-CCNC: 16 U/L (ref 15–37)
BUN BLD-MCNC: 20 MG/DL (ref 7–18)
GLUCOSE SERPLBLD-MCNC: 146 MG/DL (ref 74–106)
HCT VFR BLD CALC: 45.3 % (ref 39.6–49)
LIPASE SERPL-CCNC: 63 U/L (ref 73–393)
LYMPHOCYTES # SPEC AUTO: 2.7 K/UL (ref 0.7–4.9)
METHAMPHET UR QL SCN: NEGATIVE
MORPHOLOGY BLD-IMP: (no result)
PMV BLD: 7.8 FL (ref 7.6–11.3)
POTASSIUM SERPL-SCNC: 3.8 MMOL/L (ref 3.5–5.1)
RBC # BLD: 4.75 M/UL (ref 4.33–5.43)
THC SERPL-MCNC: POSITIVE NG/ML
TOXIC GRANULES BLD QL SMEAR: PRESENT
UA COMPLETE W REFLEX CULTURE PNL UR: (no result)

## 2019-03-03 PROCEDURE — 81003 URINALYSIS AUTO W/O SCOPE: CPT

## 2019-03-03 PROCEDURE — 74176 CT ABD & PELVIS W/O CONTRAST: CPT

## 2019-03-03 PROCEDURE — 36415 COLL VENOUS BLD VENIPUNCTURE: CPT

## 2019-03-03 PROCEDURE — 96375 TX/PRO/DX INJ NEW DRUG ADDON: CPT

## 2019-03-03 PROCEDURE — 96374 THER/PROPH/DIAG INJ IV PUSH: CPT

## 2019-03-03 PROCEDURE — 99284 EMERGENCY DEPT VISIT MOD MDM: CPT

## 2019-03-03 PROCEDURE — 80307 DRUG TEST PRSMV CHEM ANLYZR: CPT

## 2019-03-03 PROCEDURE — 83690 ASSAY OF LIPASE: CPT

## 2019-03-03 PROCEDURE — 85025 COMPLETE CBC W/AUTO DIFF WBC: CPT

## 2019-03-03 PROCEDURE — 80048 BASIC METABOLIC PNL TOTAL CA: CPT

## 2019-03-03 PROCEDURE — 96361 HYDRATE IV INFUSION ADD-ON: CPT

## 2019-03-03 PROCEDURE — 82550 ASSAY OF CK (CPK): CPT

## 2019-03-03 PROCEDURE — 81015 MICROSCOPIC EXAM OF URINE: CPT

## 2019-03-03 PROCEDURE — 80076 HEPATIC FUNCTION PANEL: CPT

## 2019-03-03 PROCEDURE — 76377 3D RENDER W/INTRP POSTPROCES: CPT

## 2019-03-03 NOTE — RAD REPORT
EXAM DESCRIPTION:  CT - Stone Protocol - 3/3/2019 2:23 pm

 

CLINICAL HISTORY:  Abdominal pain, flank pain, kidney stone history

 

COMPARISON:  January 16, 2019

 

TECHNIQUE:  Axial 5 mm thick images were obtained without oral or IV contrast. The field-of-view span
s the entirety of the  system including uppermost abdomen and lung bases.

 

All CT scans are performed using dose optimization technique as appropriate and may include automated
 exposure control or mA/KV adjustment according to patient size.

 

FINDINGS:  No hydronephrosis is present and no obstructing ureteral calculi. Multiple bilateral 2-5 m
m calyx calculi present in each kidney. No suspicious renal masses. Isodense masses and pyelonephriti
s are not excluded on a stone protocol CT scan. Urinary bladder is mostly contracted. No bladder calc
alexi. Patient has several phleboliths in the lower pelvis. No significant adrenal finding.

 

Imaged portions of the liver, spleen and pancreas show no suspicious findings on non-contrast imaging
. No gallbladder or biliary tree abnormality identified.

 

No suspicious bowel findings.

 

No hernia, mass or bulky lymphadenopathy noted. No free air, free fluid or inflammatory stranding.

 

No significant bony abnormality.

 

IMPRESSION:  No hydronephrosis. No obstructing calculi.

 

Bilateral nonobstructing 2-5 mm calyx calculi.

 

Isodense masses and pyelonephritis are not excluded on stone protocol technique.

## 2019-03-03 NOTE — XMS REPORT
Patient Summary Document

 Created on:March 3, 2019



Patient:HERMILA MCDONOUGH

Sex:Male

:1990

External Reference #:[id number]





Demographics







 Address  [address] [place] [address]

 

 Home Phone  [phone]

 

 Work Phone  [phone]

 

 Preferred Language  Unknown

 

 Marital Status  Unknown

 

 Oriental orthodox Affiliation  Unknown

 

 Race  Unknown

 

 Additional Race(s)  Unavailable

 

 Ethnic Group  Unknown









Author







 Organization  [facility]

 

 Address  [address] [place] [address] [address]. Maki [address] [place] [id number]

 

 Phone  [phone]









Care Team Providers







 Name  Role  Phone

 

 Unavailable  Unavailable  Unavailable









Problems

This patient has no known problems.



Allergies, Adverse Reactions, Alerts

This patient has no known allergies or adverse reactions.



Medications

This patient has no known medications.

## 2019-03-03 NOTE — XMS REPORT
[facility]

 Created on:2019



Patient:Zhang Quijano

Sex:Male

:1990

External Reference #:[id number]





Demographics







 Address  [address] [place] [id number]

 

 Phone  [phone]

 

 Preferred Language  en

 

 Marital Status  Unknown

 

 Hindu Affiliation  Unknown

 

 Race  White

 

 Ethnic Group  Unknown









Author







 Organization  [employer]









Care Team Providers







 Name  Role  Phone

 

 Andrés Goncalves  Provider Role  Unavailable









Allergies, Adverse Reactions, Alerts







 Substance  Reaction  Event Type

 

 Neurontin  hives  Drug Allergy







Problems







 Problem Type  Condition  Code  Onset Dates  Condition Status

 

 Assessment  Tobacco abuse  Z72.0    Active

 

 Problem  Polysubstance abuse  F19.10    Active

 

 Problem  DTs (delirium tremens)  F10.231    Active

 

 Problem  Anxiety  F41.9    Active

 

 Assessment  Polysubstance abuse  F19.10    Active

 

 Assessment  History of alcohol abuse  Z87.898    Active

 

 Assessment  Anxiety  F41.9    Active







Medications







 Medication  Code  Code  Instructions  Start  End  Status  Dosage



   System      Date  Date    

 

 BusPIRone HCl  NDC  [id number]  10 MG Orally      Active  1 tablet



       Twice a day        

 

 HydrOXYzine HCl  NDC  [id number]  25 MG Orally  Dec 11,    Active  1 tablet



       every 8 hrs  2018      as needed







Results

No Known Results



Summary Purpose

[employer] Submission

## 2019-03-03 NOTE — EDPHYS
Physician Documentation                                                                           

 [facility]                                                                

Name: Zhang Quijano                                                                                

Age: 28 yrs                                                                                       

Sex: Male                                                                                         

: 1990                                                                                   

MRN: [id number]                                                                                   

Arrival Date: 2019                                                                          

Time: 13:49                                                                                       

Account#: [id number]                                                                            

Bed 25                                                                                            

Private MD:                                                                                       

ED Physician Phoenix Nunez                                                                       

HPI:                                                                                              

                                                                                             

15:00 This 28 yrs old  Male presents to ER via Ambulatory with complaints of         pm1 

      Possible Kidney Stone.                                                                      

15:00 The patient presents with pain that is acute, with no known mechanism of injury.        pm1 

15:00 The symptoms are located in the low back. The pain does not radiate. The problem was    pm1 

      sustained possible kidney stone. Onset: The symptoms/episode began/occurred yesterday.      

      Modifying factors: The patient symptoms are alleviated by nothing, the patient symptoms     

      are aggravated by nothing. Associated signs and symptoms: Pertinent positives: nausea,      

      vomiting, Pertinent negatives: abdominal pain, chest pain, constipation, dysuria,           

      fever, numbness, tingling. Severity of symptoms: in the emergency department the            

      symptoms are actually worse. The patient has experienced similar episodes in the past,      

      a few times. The patient has not recently seen a physician.                                 

                                                                                                  

Historical:                                                                                       

- Allergies:                                                                                      

14:01 Neurontin;                                                                              la1 

- PMHx:                                                                                           

14:01 Anxiety; Back pain; Kidney stones;                                                      la1 

                                                                                                  

- Immunization history:: Adult Immunizations up to date.                                          

- Social history:: Smoking status: Patient uses tobacco products, smokes one-half pack            

  cigarettes per day.                                                                             

- Ebola Screening: : No symptoms or risks identified at this time.                                

                                                                                                  

                                                                                                  

ROS:                                                                                              

15:00 Constitutional: Negative for fever, chills, and weight loss, Eyes: Negative for injury, pm1 

      pain, redness, and discharge, ENT: Negative for injury, pain, and discharge, Neck:          

      Negative for injury, pain, and swelling, Cardiovascular: Negative for chest pain,           

      palpitations, and edema, Respiratory: Negative for shortness of breath, cough,              

      wheezing, and pleuritic chest pain, Abdomen/GI: Negative for abdominal pain, nausea,        

      vomiting, diarrhea, and constipation.                                                       

15:00 : Negative for injury, bleeding, discharge, and swelling, MS/Extremity: Negative for      

      injury and deformity, Skin: Negative for injury, rash, and discoloration, Neuro:            

      Negative for headache, weakness, numbness, tingling, and seizure.                           

15:00 Back: Positive for flank pain, bilaterally.                                                 

                                                                                                  

Exam:                                                                                             

15:00 Constitutional:  This is a well developed, well nourished patient who is awake, alert,  pm1 

      and in no acute distress. Head/Face:  Normocephalic, atraumatic. Eyes:  Pupils equal        

      round and reactive to light, extra-ocular motions intact.  Lids and lashes normal.          

      Conjunctiva and sclera are non-icteric and not injected.  Cornea within normal limits.      

      Periorbital areas with no swelling, redness, or edema. ENT:  Nares patent. No nasal         

      discharge, no septal abnormalities noted.  Tympanic membranes are normal and external       

      auditory canals are clear.  Oropharynx with no redness, swelling, or masses, exudates,      

      or evidence of obstruction, uvula midline.  Mucous membranes moist. Neck:  Trachea          

      midline, no thyromegaly or masses palpated, and no cervical lymphadenopathy.  Supple,       

      full range of motion without nuchal rigidity, or vertebral point tenderness.  No            

      Meningismus. Chest/axilla:  Normal chest wall appearance and motion.  Nontender with no     

      deformity.  No lesions are appreciated. Cardiovascular:  Regular rate and rhythm with a     

      normal S1 and S2.  No gallops, murmurs, or rubs.  Normal PMI, no JVD.  No pulse             

      deficits. Respiratory:  Lungs have equal breath sounds bilaterally, clear to                

      auscultation and percussion.  No rales, rhonchi or wheezes noted.  No increased work of     

      breathing, no retractions or nasal flaring. Abdomen/GI:  Soft, non-tender, with normal      

      bowel sounds.  No distension or tympany.  No guarding or rebound.  No evidence of           

      tenderness throughout.                                                                      

15:00 Skin:  Warm, dry with normal turgor.  Normal color with no rashes, no lesions, and no       

      evidence of cellulitis. MS/ Extremity:  Pulses equal, no cyanosis.  Neurovascular           

      intact.  Full, normal range of motion.                                                      

15:00 Back: pain, that is mild, of the  left low back and right low back, normal spinal           

      alignment noted.                                                                            

15:00 Neuro: Orientation: is normal, Motor: is normal, moves all fours.                           

                                                                                                  

Vital Signs:                                                                                      

14:01  / 100; Pulse 122; Resp 18; Temp 97.6; Pulse Ox 98% on R/A; Weight 63.5 kg;       la1 

      Height 5 ft. 11 in. (180.34 cm); Pain 10/10;                                                

15:00  / 103; Pulse 87; Resp 19; Pulse Ox 100% ; Pain 10/10;                            ca1 

16:00  / 100; Pulse 72; Resp 19; Pulse Ox 100% on R/A;                                  ca1 

16:43  / 102; Pulse 85; Resp 19; Pulse Ox 100% on R/A;                                  ca1 

17:45  / 113; Pulse 82; Resp 19; Pulse Ox 100% on R/A;                                  ca1 

18:30  / 88; Pulse 74; Resp 19; Pulse Ox 100% on R/A;                                   ca1 

14:01 Body Mass Index 19.53 (63.50 kg, 180.34 cm)                                             la1 

                                                                                                  

MDM:                                                                                              

14:03 Patient medically screened.                                                             pm1 

18:50 Data reviewed: vital signs. Data interpreted: Pulse oximetry: on room air is 100 %.     pm1 

      Interpretation:. Counseling: I had a detailed discussion with the patient and/or            

      guardian regarding: the historical points, exam findings, and any diagnostic results        

      supporting the discharge/admit diagnosis, lab results, radiology results, the need for      

      outpatient follow up, to return to the emergency department if symptoms worsen or           

      persist or if there are any questions or concerns that arise at home.                       

18:50 ED course: Patient with negative CT examination and labs except for UDS and WBC.        pm1 

      Patient reexamination of back negative for tenderness is walking without any                

      difficulty. Patient's with elevated white count likely due to vomiting. .                   

                                                                                                  

                                                                                             

14:03 Order name: Basic Metabolic Panel; Complete Time: 14:58                                 pm1 

                                                                                             

14:03 Order name: CBC with Diff; Complete Time: 16:01                                         pm1 

                                                                                             

14:03 Order name: Creatinine for Radiology; Complete Time: 14:58                              pm1 

                                                                                             

14:03 Order name: Hepatic Function; Complete Time: 14:58                                      pm1 

                                                                                             

14:03 Order name: Lipase; Complete Time: 14:58                                                pm1 

                                                                                             

15:26 Order name: Manual Differential; Complete Time: 16:01                                   EDMS

                                                                                             

14:03 Order name: CT Stone Protocol; Complete Time: 14:43                                     pm1 

                                                                                             

16:26 Order name: UDS; Complete Time: 17:01                                                   pm1 

                                                                                             

16:28 Order name: Urine Dipstick--Ancillary (enter results); Complete Time: 17:04             eb  

                                                                                             

17:05 Order name: Urine Microscopic Only; Complete Time: 17:45                                pm1 

                                                                                             

17:05 Order name: CPK; Complete Time: 17:45                                                   pm1 

                                                                                             

14:03 Order name: IV Saline Lock; Complete Time: 14:17                                        pm1 

                                                                                             

14:03 Order name: Labs collected and sent; Complete Time: 14:17                               pm1 

                                                                                             

14:03 Order name: Urine Dipstick-Ancillary (obtain specimen); Complete Time: 16:41            pm1 

                                                                                                  

Administered Medications:                                                                         

14:09 Drug: NS 0.9% 1000 ml Route: IV; Rate: 1000 ml; Site: left antecubital;                 la1 

15:00 Follow up: IV Status: Completed infusion                                                ca1 

14:10 Drug: morphine 4 mg Route: IVP; Site: left antecubital;                                 la1 

14:40 Follow up: Response: No adverse reaction; Pain is unchanged, physician notified         ca1 

14:10 Drug: Zofran 4 mg Route: IVP; Site: left antecubital;                                   la1 

14:40 Follow up: Response: No adverse reaction; Nausea is decreased                           ca1 

14:30 Drug: morphine 4 mg Route: IVP; Site: left antecubital;                                 ca1 

16:42 Follow up: Response: No adverse reaction; Pain is decreased                             ca1 

17:06 Drug: NS 0.9% 1000 ml Route: IV; Rate: 1000 ml; Site: left antecubital;                 ca1 

18:00 Follow up: Response: No adverse reaction; IV Status: Completed infusion                 ca1 

                                                                                                  

                                                                                                  

Disposition:                                                                                      

                                                                                             

11:59 Co-signature as Attending Physician, Phoenix Nunez MD.                                  gs  

                                                                                                  

Disposition:                                                                                      

19 18:51 Discharged to Home. Impression: Low back pain, Vomiting, Cannabis abuse.           

- Condition is Stable.                                                                            

- Discharge Instructions: Back Pain, Adult, Nausea and Vomiting, Adult, What You Need             

  To Know About Illegal Drug Use and Dependence, Youth.                                           

- Prescriptions for Zofran 4 mg Oral Tablet - take 1 tablet by ORAL route every 12                

  hours As needed; 20 tablet. Diclofenac Sodium 75 mg Oral Tablet Sustained Release -             

  take 1 tablet by ORAL route 2 times per day; 30 tablet. Phenergan 25 mg Rectal                  

  Suppository - insert 1 suppository by RECTAL route every 6 hours As needed; 12                  

  suppository.                                                                                    

- Medication Reconciliation Form, Thank You Letter, Antibiotic Education, Prescription            

  Opioid Use form.                                                                                

- Follow up: Emergency Department; When: As needed; Reason: Worsening of condition.               

  Follow up: Private Physician; When: 2 - 3 days; Reason: Recheck today's complaints,             

  Continuance of care, Re-evaluation by your physician.                                           

- Problem is new.                                                                                 

- Symptoms have improved.                                                                         

                                                                                                  

                                                                                                  

                                                                                                  

Signatures:                                                                                       

Dispatcher [employer]                           EDMS                                                 

Eliseo Burrell RN                         RN   la1                                                  

Jonathan Cerda, NP                    NP   pm1                                                  

Phoenix Nunez MD MD   gs                                                   

Acob, Thao RN                        RN   ca1                                                  

                                                                                                  

Corrections: (The following items were deleted from the chart)                                    

                                                                                             

18:53 18:51 2019 18:51 Discharged to Home. Impression: Low back pain. Condition is      pm1 

      Stable. Forms are Medication Reconciliation Form, Thank You Letter, Antibiotic              

      Education, Prescription Opioid Use. Follow up: Emergency Department; When: As needed;       

      Reason: Worsening of condition. Follow up: Private Physician; When: 2 - 3 days; Reason:     

      Recheck today's complaints, Continuance of care, Re-evaluation by your physician.           

      Problem is new. Symptoms have improved. pm1                                                 

18:54 18:53 2019 18:51 Discharged to Home. Impression: Low back pain; Vomiting.         pm1 

      Condition is Stable. Discharge Instructions: Back Pain, Adult, Nausea and Vomiting,         

      Adult. Prescriptions for Zofran 4 mg Oral Tablet - take 1 tablet by ORAL route every 12     

      hours As needed; 20 tablet, Diclofenac Sodium 75 mg Oral Tablet Sustained Release -         

      take 1 tablet by ORAL route 2 times per day; 30 tablet, Phenergan 25 mg Rectal              

      Suppository - insert 1 suppository by RECTAL route every 6 hours As needed; 12              

      suppository. and Forms are Medication Reconciliation Form, Thank You Letter, Antibiotic     

      Education, Prescription Opioid Use. Follow up: Emergency Department; When: As needed;       

      Reason: Worsening of condition. Follow up: Private Physician; When: 2 - 3 days; Reason:     

      Recheck today's complaints, Continuance of care, Re-evaluation by your physician.           

      Problem is new. Symptoms have improved. pm1                                                 

19:05 18:54 2019 18:51 Discharged to Home. Impression: Low back pain; Vomiting;         ca1 

      Cannabis abuse. Condition is Stable. Discharge Instructions: Back Pain, Adult, Nausea       

      and Vomiting, Adult. Prescriptions for Zofran 4 mg Oral Tablet - take 1 tablet by ORAL      

      route every 12 hours As needed; 20 tablet, Diclofenac Sodium 75 mg Oral Tablet              

      Sustained Release - take 1 tablet by ORAL route 2 times per day; 30 tablet, Phenergan       

      25 mg Rectal Suppository - insert 1 suppository by RECTAL route every 6 hours As            

      needed; 12 suppository. and Forms are Medication Reconciliation Form, Thank You Letter,     

      Antibiotic Education, Prescription Opioid Use. Follow up: Emergency Department; When:       

      As needed; Reason: Worsening of condition. Follow up: Private Physician; When: 2 - 3        

      days; Reason: Recheck today's complaints, Continuance of care, Re-evaluation by your        

      physician. Problem is new. Symptoms have improved. pm1                                      

                                                                                                  

**************************************************************************************************

## 2019-03-03 NOTE — ER
Nurse's Notes                                                                                     

 [facility]                                                                

Name: Zhang Quijano                                                                                

Age: 28 yrs                                                                                       

Sex: Male                                                                                         

: 1990                                                                                   

MRN: [id number]                                                                                   

Arrival Date: 2019                                                                          

Time: 13:49                                                                                       

Account#: [id number]                                                                            

Bed 25                                                                                            

Private MD:                                                                                       

Diagnosis: Low back pain;Vomiting;Cannabis abuse                                                  

                                                                                                  

Presentation:                                                                                     

                                                                                             

14:00 Presenting complaint: Patient states: I get kidney stones a lot and I am in really bad  la1 

      pain, it started yesterday and I have been vomiting. Transition of care: patient was        

      not received from another setting of care. Onset of symptoms was 2019. Risk       

      Assessment: Do you want to hurt yourself or someone else? Patient reports no desire to      

      harm self or others. Initial Sepsis Screen: Does the patient meet any 2 criteria? No.       

      Patient's initial sepsis screen is negative. Does the patient have a suspected source       

      of infection? No. Patient's initial sepsis screen is negative. Care prior to arrival:       

      None.                                                                                       

14:00 Method Of Arrival: Ambulatory                                                           la1 

14:00 Acuity: SUZETTE 3                                                                           la1 

                                                                                                  

Historical:                                                                                       

- Allergies:                                                                                      

14:01 Neurontin;                                                                              la1 

- PMHx:                                                                                           

14:01 Anxiety; Back pain; Kidney stones;                                                      la1 

                                                                                                  

- Immunization history:: Adult Immunizations up to date.                                          

- Social history:: Smoking status: Patient uses tobacco products, smokes one-half pack            

  cigarettes per day.                                                                             

- Ebola Screening: : No symptoms or risks identified at this time.                                

                                                                                                  

                                                                                                  

Screenin:00 Abuse screen: Denies threats or abuse. Denies injuries from another. Nutritional        ca1 

      screening: No deficits noted. Tuberculosis screening: No symptoms or risk factors           

      identified. Fall Risk                                                                       

                                                                                                  

Assessment:                                                                                       

14:00 General: Appears in no apparent distress. uncomfortable, ill, Behavior is calm,         ca1 

      cooperative, appropriate for age. Pain: Complains of pain in abdomen Pain radiates to       

      low back area, left low back and right low back Pain currently is 10 out of 10 on a         

      pain scale. Pain began 1 day ago. Is continuous. Neuro: Level of Consciousness is           

      awake, alert, obeys commands, Oriented to person, place, time, situation.                   

      Cardiovascular: Heart tones S1 S2 present Capillary refill < 3 seconds Patient's skin       

      is warm and dry. Respiratory: Airway is patent Respiratory effort is even, unlabored,       

      Respiratory pattern is regular, symmetrical, Breath sounds are clear bilaterally. GI:       

      Abdomen is flat, non-distended, Bowel sounds present X 4 quads. Abd is soft X 4 quads       

      Abdomen is tender to palpation X 4 quads. : Reports burning with urination, cramping.     

      EENT: No signs and/or symptoms were reported regarding the EENT system. Derm: Skin is       

      intact, is healthy with good turgor, Skin is pink, warm \T\ dry. Musculoskeletal:           

      Circulation, motion, and sensation intact. Capillary refill < 3 seconds.                    

15:00 Reassessment: Patient appears in no apparent distress at this time. Patient and/or      ca1 

      family updated on plan of care and expected duration. Pain level reassessed. Patient is     

      alert, oriented x 3, equal unlabored respirations, skin warm/dry/pink.                      

16:00 Reassessment: Patient appears in no apparent distress at this time. Patient and/or      ca1 

      family updated on plan of care and expected duration. Pain level reassessed. Patient is     

      alert, oriented x 3, equal unlabored respirations, skin warm/dry/pink. Pt sleeping          

      awakens easily to verbal stimuli.                                                           

16:43 Reassessment: Patient appears in no apparent distress at this time. Patient and/or      ca1 

      family updated on plan of care and expected duration. Pain level reassessed. Patient is     

      alert, oriented x 3, equal unlabored respirations, skin warm/dry/pink.                      

17:45 Reassessment: Patient appears in no apparent distress at this time. Patient and/or      ca1 

      family updated on plan of care and expected duration. Pain level reassessed. Patient is     

      alert, oriented x 3, equal unlabored respirations, skin warm/dry/pink.                      

18:48 Reassessment: Patient appears in no apparent distress at this time. Patient and/or      ca1 

      family updated on plan of care and expected duration. Pain level reassessed. Patient is     

      alert, oriented x 3, equal unlabored respirations, skin warm/dry/pink. Pt asleep,           

      father at bedside.                                                                          

                                                                                                  

Vital Signs:                                                                                      

14:01  / 100; Pulse 122; Resp 18; Temp 97.6; Pulse Ox 98% on R/A; Weight 63.5 kg;       la1 

      Height 5 ft. 11 in. (180.34 cm); Pain 10/10;                                                

15:00  / 103; Pulse 87; Resp 19; Pulse Ox 100% ; Pain 10/10;                            ca1 

16:00  / 100; Pulse 72; Resp 19; Pulse Ox 100% on R/A;                                  ca1 

16:43  / 102; Pulse 85; Resp 19; Pulse Ox 100% on R/A;                                  ca1 

17:45  / 113; Pulse 82; Resp 19; Pulse Ox 100% on R/A;                                  ca1 

18:30  / 88; Pulse 74; Resp 19; Pulse Ox 100% on R/A;                                   ca1 

14:01 Body Mass Index 19.53 (63.50 kg, 180.34 cm)                                             la1 

                                                                                                  

ED Course:                                                                                        

13:49 Patient arrived in ED.                                                                  as  

14:01 Triage completed.                                                                       la1 

14:01 Arm band placed on left wrist.                                                          la1 

14:02 Jonathan Cerda NP is PHCP.                                                           pm1 

14:02 Phoenix Nunez MD is Attending Physician.                                              pm1 

14:05 Initial lab(s) drawn, by me, sent to lab. Inserted saline lock: 20 gauge in left        jp3 

      antecubital area, using aseptic technique. Blood collected.                                 

14:06 Thao Hernández, RN is Primary Nurse.                                                      ca1 

14:15 Pulse ox on. NIBP on.                                                                   jp3 

14:16 Bed in low position. Call light in reach. Side rails up X 1. Side rails up X2. Warm     jp3 

      blanket given. Pillow given.                                                                

14:17 Basic Metabolic Panel Sent.                                                             jp3 

14:17 CBC with Diff Sent.                                                                     jp3 

14:17 Creatinine for Radiology Sent.                                                          jp3 

14:17 Hepatic Function Sent.                                                                  jp3 

14:17 Lipase Sent.                                                                            jp3 

14:20 Patient moved to CT via stretcher.                                                      mw3 

14:23 CT completed. Patient tolerated procedure well. Patient moved back from CT.             mw3 

14:24 CT [facility] Protocol In Process Unspecified.                                               EDMS

16:20 Urine collected: clean catch specimen, eduardo colored.                                   ca1 

19:05 No provider procedures requiring assistance completed. IV discontinued, intact,         ca1 

      bleeding controlled, No redness/swelling at site. Pressure dressing applied.                

                                                                                                  

Administered Medications:                                                                         

14:09 Drug: NS 0.9% 1000 ml Route: IV; Rate: 1000 ml; Site: left antecubital;                 la1 

15:00 Follow up: IV Status: Completed infusion                                                ca1 

14:10 Drug: morphine 4 mg Route: IVP; Site: left antecubital;                                 la1 

14:40 Follow up: Response: No adverse reaction; Pain is unchanged, physician notified         ca1 

14:10 Drug: Zofran 4 mg Route: IVP; Site: left antecubital;                                   la1 

14:40 Follow up: Response: No adverse reaction; Nausea is decreased                           ca1 

14:30 Drug: morphine 4 mg Route: IVP; Site: left antecubital;                                 ca1 

16:42 Follow up: Response: No adverse reaction; Pain is decreased                             ca1 

17:06 Drug: NS 0.9% 1000 ml Route: IV; Rate: 1000 ml; Site: left antecubital;                 ca1 

18:00 Follow up: Response: No adverse reaction; IV Status: Completed infusion                 ca1 

                                                                                                  

                                                                                                  

Outcome:                                                                                          

18:51 Discharge ordered by MD.                                                                pm1 

19:05 Discharged to home ambulatory.                                                          ca1 

19:05 Condition: stable                                                                           

19:05 Discharge instructions given to patient, Instructed on discharge instructions, follow       

      up and referral plans. medication usage, Demonstrated understanding of instructions,        

      follow-up care, medications, Prescriptions given X 3.                                       

19:05 Patient left the ED.                                                                    ca1 

                                                                                                  

Signatures:                                                                                       

Dispatcher [employer]                           EDMS                                                 

Angelica Degroot Lee, RN                         RN   la1                                                  

Jonathan Cerda NP                    NP   pm1                                                  

Shannon Antonio                             mw3                                                  

Giorgi Harkins                              3                                                  

Thao Hernández RN                        RN   ca1                                                  

                                                                                                  

**************************************************************************************************